# Patient Record
Sex: FEMALE | Race: OTHER | HISPANIC OR LATINO | Employment: UNEMPLOYED | URBAN - METROPOLITAN AREA
[De-identification: names, ages, dates, MRNs, and addresses within clinical notes are randomized per-mention and may not be internally consistent; named-entity substitution may affect disease eponyms.]

---

## 2020-04-02 ENCOUNTER — HOSPITAL ENCOUNTER (OUTPATIENT)
Facility: MEDICAL CENTER | Age: 20
End: 2020-04-02
Attending: OBSTETRICS & GYNECOLOGY | Admitting: OBSTETRICS & GYNECOLOGY
Payer: COMMERCIAL

## 2020-04-02 LAB
APPEARANCE UR: CLEAR
COLOR UR AUTO: YELLOW
GLUCOSE UR QL STRIP.AUTO: NEGATIVE MG/DL
KETONES UR QL STRIP.AUTO: 15 MG/DL
LEUKOCYTE ESTERASE UR QL STRIP.AUTO: ABNORMAL
NITRITE UR QL STRIP.AUTO: NEGATIVE
PH UR STRIP.AUTO: 7.5 [PH] (ref 5–8)
PROT UR QL STRIP: NEGATIVE MG/DL
RBC UR QL AUTO: NEGATIVE
SP GR UR: 1.01 (ref 1–1.03)

## 2020-04-02 PROCEDURE — 81002 URINALYSIS NONAUTO W/O SCOPE: CPT

## 2020-04-02 NOTE — DISCHARGE INSTRUCTIONS
General Instructions:  · If you think you are in labor, time contractions (lying on your left side) from the beginning of one contraction to the beginning of the next contraction for at least one hour.  · Increase fluid intake: you should consume 10-12 8 oz glasses of non-caffeinated fluid per day.  · Report any pressure or burning on urination to your physician.  · Monitor fetal movement: If you notice an absence or decrease in fetal movement, drink a large glass of water and rest on your side.  If there is no increase in movement, call your physician or go to the hospital for further evaluation.  · Report any sudden, sharp abdominal pain.  · Report any bleeding.  Spotting or pinkish discharge is normal after vaginal exam.  You may also spot after sexual intercourse.        Other Instructions:  Please carefully review your entire AFTER VISIT SUMMARY document for all discharge instructions.

## 2020-04-02 NOTE — PROGRESS NOTES
"20 y.o.  EDC 20 EG A24.2 here to LDA3 with FOB Ticen c/o increasing dizziness, n/v, and weakness for the last 2 1/2 months. PT states \"a few days ago she passed out and hit her head\". Pt states she has thrown up 2 or 3 times today. Last ate 2100. Pt states she called the office and they told her to come in and be evaluated. Clean catch urine specimen obtained and dipped. SG 1.015, small ketones, trace leukocytes. . No uc's. Report to Dr. Vela. PT has a f/u appointment on 20. Order of Zofran sent to [facility] [address] by MD. Discharge order received. Discharged to home. Pt educated to increase fluid, change positions slowly, and return to the hospital if symptoms worsen. Pt and fob verbalized understanding. Discharged to home, ambulatory.   "

## 2020-04-06 ENCOUNTER — HOSPITAL ENCOUNTER (OUTPATIENT)
Facility: MEDICAL CENTER | Age: 20
End: 2020-04-06
Attending: OBSTETRICS & GYNECOLOGY | Admitting: OBSTETRICS & GYNECOLOGY
Payer: COMMERCIAL

## 2020-04-06 VITALS
OXYGEN SATURATION: 98 % | WEIGHT: 138 LBS | HEART RATE: 77 BPM | SYSTOLIC BLOOD PRESSURE: 91 MMHG | DIASTOLIC BLOOD PRESSURE: 58 MMHG

## 2020-04-06 LAB
APPEARANCE UR: CLEAR
COLOR UR AUTO: YELLOW
GLUCOSE UR QL STRIP.AUTO: NEGATIVE MG/DL
KETONES UR QL STRIP.AUTO: NEGATIVE MG/DL
LEUKOCYTE ESTERASE UR QL STRIP.AUTO: NEGATIVE
NITRITE UR QL STRIP.AUTO: NEGATIVE
PH UR STRIP.AUTO: 7 [PH] (ref 5–8)
PROT UR QL STRIP: ABNORMAL MG/DL
RBC UR QL AUTO: NEGATIVE
SP GR UR: 1.01 (ref 1–1.03)

## 2020-04-06 PROCEDURE — 700111 HCHG RX REV CODE 636 W/ 250 OVERRIDE (IP): Performed by: SPECIALIST

## 2020-04-06 PROCEDURE — 81002 URINALYSIS NONAUTO W/O SCOPE: CPT

## 2020-04-06 RX ORDER — ONDANSETRON 4 MG/1
4 TABLET, ORALLY DISINTEGRATING ORAL EVERY 4 HOURS PRN
Status: DISCONTINUED | OUTPATIENT
Start: 2020-04-06 | End: 2020-04-06 | Stop reason: HOSPADM

## 2020-04-06 RX ADMIN — ONDANSETRON 4 MG: 4 TABLET, ORALLY DISINTEGRATING ORAL at 14:05

## 2020-04-06 NOTE — PROGRESS NOTES
20 y.o.  @ 24.6 weeks returns to L&D with LISANDRA Perla after passing out with vomiting at 10:00. VSS. /51 HR 66 SpO2 98%, repeat 91/58 Pulse 74 Report to Dr. Crook, Order for Zofran and reg diet. If pt able to tolerate meal may be discharged to f/u with Dr. Vela next week. Zofran given, Pt eating reg diet. Able to keep down food without vomiting. Discharge instructions given. Pt and FOB verbalized understanding. Discharged to home, ambulatory.

## 2020-04-27 ENCOUNTER — APPOINTMENT (OUTPATIENT)
Dept: RADIOLOGY | Facility: MEDICAL CENTER | Age: 20
End: 2020-04-27
Attending: EMERGENCY MEDICINE
Payer: COMMERCIAL

## 2020-04-27 ENCOUNTER — HOSPITAL ENCOUNTER (OUTPATIENT)
Facility: MEDICAL CENTER | Age: 20
End: 2020-04-27
Attending: OBSTETRICS & GYNECOLOGY | Admitting: OBSTETRICS & GYNECOLOGY
Payer: COMMERCIAL

## 2020-04-27 ENCOUNTER — HOSPITAL ENCOUNTER (EMERGENCY)
Facility: MEDICAL CENTER | Age: 20
End: 2020-04-27
Attending: EMERGENCY MEDICINE
Payer: COMMERCIAL

## 2020-04-27 VITALS
TEMPERATURE: 97.8 F | DIASTOLIC BLOOD PRESSURE: 63 MMHG | OXYGEN SATURATION: 98 % | HEART RATE: 89 BPM | SYSTOLIC BLOOD PRESSURE: 101 MMHG

## 2020-04-27 VITALS
SYSTOLIC BLOOD PRESSURE: 109 MMHG | HEART RATE: 84 BPM | DIASTOLIC BLOOD PRESSURE: 58 MMHG | TEMPERATURE: 99.7 F | HEIGHT: 64 IN | RESPIRATION RATE: 16 BRPM | OXYGEN SATURATION: 99 % | BODY MASS INDEX: 22.2 KG/M2 | WEIGHT: 130 LBS

## 2020-04-27 DIAGNOSIS — Z34.92 SECOND TRIMESTER PREGNANCY: ICD-10-CM

## 2020-04-27 DIAGNOSIS — R06.00 DYSPNEA, UNSPECIFIED TYPE: Primary | ICD-10-CM

## 2020-04-27 LAB
ALBUMIN SERPL BCP-MCNC: 3.7 G/DL (ref 3.2–4.9)
ALBUMIN/GLOB SERPL: 1.2 G/DL
ALP SERPL-CCNC: 80 U/L (ref 30–99)
ALT SERPL-CCNC: 9 U/L (ref 2–50)
ANION GAP SERPL CALC-SCNC: 12 MMOL/L (ref 7–16)
AST SERPL-CCNC: 18 U/L (ref 12–45)
BASOPHILS # BLD AUTO: 0.2 % (ref 0–1.8)
BASOPHILS # BLD: 0.02 K/UL (ref 0–0.12)
BILIRUB SERPL-MCNC: 0.3 MG/DL (ref 0.1–1.5)
BUN SERPL-MCNC: 7 MG/DL (ref 8–22)
CALCIUM SERPL-MCNC: 9 MG/DL (ref 8.5–10.5)
CHLORIDE SERPL-SCNC: 102 MMOL/L (ref 96–112)
CO2 SERPL-SCNC: 20 MMOL/L (ref 20–33)
COVID ORDER STATUS COVID19: YES
CREAT SERPL-MCNC: 0.47 MG/DL (ref 0.5–1.4)
EKG IMPRESSION: NORMAL
EOSINOPHIL # BLD AUTO: 0.01 K/UL (ref 0–0.51)
EOSINOPHIL NFR BLD: 0.1 % (ref 0–6.9)
ERYTHROCYTE [DISTWIDTH] IN BLOOD BY AUTOMATED COUNT: 40.1 FL (ref 35.9–50)
FIBRONECTIN FETAL SPEC QL: NEGATIVE
GLOBULIN SER CALC-MCNC: 3 G/DL (ref 1.9–3.5)
GLUCOSE SERPL-MCNC: 138 MG/DL (ref 65–99)
HCT VFR BLD AUTO: 31.5 % (ref 37–47)
HGB BLD-MCNC: 10.1 G/DL (ref 12–16)
IMM GRANULOCYTES # BLD AUTO: 0.04 K/UL (ref 0–0.11)
IMM GRANULOCYTES NFR BLD AUTO: 0.4 % (ref 0–0.9)
IRON SATN MFR SERPL: 11 % (ref 15–55)
IRON SERPL-MCNC: 64 UG/DL (ref 40–170)
LYMPHOCYTES # BLD AUTO: 2.89 K/UL (ref 1–4.8)
LYMPHOCYTES NFR BLD: 25.3 % (ref 22–41)
MCH RBC QN AUTO: 25.6 PG (ref 27–33)
MCHC RBC AUTO-ENTMCNC: 32.1 G/DL (ref 33.6–35)
MCV RBC AUTO: 79.7 FL (ref 81.4–97.8)
MONOCYTES # BLD AUTO: 0.51 K/UL (ref 0–0.85)
MONOCYTES NFR BLD AUTO: 4.5 % (ref 0–13.4)
NEUTROPHILS # BLD AUTO: 7.94 K/UL (ref 2–7.15)
NEUTROPHILS NFR BLD: 69.5 % (ref 44–72)
NRBC # BLD AUTO: 0 K/UL
NRBC BLD-RTO: 0 /100 WBC
PLATELET # BLD AUTO: 320 K/UL (ref 164–446)
PMV BLD AUTO: 11 FL (ref 9–12.9)
POTASSIUM SERPL-SCNC: 3.4 MMOL/L (ref 3.6–5.5)
PROT SERPL-MCNC: 6.7 G/DL (ref 6–8.2)
RBC # BLD AUTO: 3.95 M/UL (ref 4.2–5.4)
SARS-COV-2 RNA RESP QL NAA+PROBE: NEGATIVE
SODIUM SERPL-SCNC: 134 MMOL/L (ref 135–145)
SPECIMEN SOURCE: NORMAL
TIBC SERPL-MCNC: 597 UG/DL (ref 250–450)
TROPONIN T SERPL-MCNC: <6 NG/L (ref 6–19)
UIBC SERPL-MCNC: 533 UG/DL (ref 110–370)
WBC # BLD AUTO: 11.4 K/UL (ref 4.8–10.8)

## 2020-04-27 PROCEDURE — 80053 COMPREHEN METABOLIC PANEL: CPT

## 2020-04-27 PROCEDURE — 71045 X-RAY EXAM CHEST 1 VIEW: CPT

## 2020-04-27 PROCEDURE — 83540 ASSAY OF IRON: CPT

## 2020-04-27 PROCEDURE — 700117 HCHG RX CONTRAST REV CODE 255: Performed by: EMERGENCY MEDICINE

## 2020-04-27 PROCEDURE — 83550 IRON BINDING TEST: CPT

## 2020-04-27 PROCEDURE — 93005 ELECTROCARDIOGRAM TRACING: CPT | Performed by: EMERGENCY MEDICINE

## 2020-04-27 PROCEDURE — 99285 EMERGENCY DEPT VISIT HI MDM: CPT

## 2020-04-27 PROCEDURE — G2023 SPECIMEN COLLECT COVID-19: HCPCS | Performed by: OBSTETRICS & GYNECOLOGY

## 2020-04-27 PROCEDURE — 82731 ASSAY OF FETAL FIBRONECTIN: CPT

## 2020-04-27 PROCEDURE — U0004 COV-19 TEST NON-CDC HGH THRU: HCPCS

## 2020-04-27 PROCEDURE — 36415 COLL VENOUS BLD VENIPUNCTURE: CPT

## 2020-04-27 PROCEDURE — 71275 CT ANGIOGRAPHY CHEST: CPT

## 2020-04-27 PROCEDURE — 85025 COMPLETE CBC W/AUTO DIFF WBC: CPT

## 2020-04-27 PROCEDURE — 84484 ASSAY OF TROPONIN QUANT: CPT

## 2020-04-27 RX ADMIN — IOHEXOL 80 ML: 350 INJECTION, SOLUTION INTRAVENOUS at 19:48

## 2020-04-27 ASSESSMENT — ENCOUNTER SYMPTOMS
WEAKNESS: 0
ABDOMINAL PAIN: 0
FOCAL WEAKNESS: 0
WHEEZING: 0
FEVER: 0
COUGH: 0
SHORTNESS OF BREATH: 1
BACK PAIN: 0
NECK PAIN: 0
VOMITING: 0
DIZZINESS: 0
SORE THROAT: 0
HEADACHES: 0
NAUSEA: 0

## 2020-04-27 ASSESSMENT — LIFESTYLE VARIABLES: SUBSTANCE_ABUSE: 0

## 2020-04-27 NOTE — DISCHARGE INSTRUCTIONS
General Instructions:  · If you think you are in labor, time contractions (lying on your left side) from the beginning of one contraction to the beginning of the next contraction for at least one hour.  · Increase fluid intake: you should consume 10-12 8 oz glasses of non-caffeinated fluid per day.  · Report any pressure or burning on urination to your physician.  · Monitor fetal movement: If you notice an absence or decrease in fetal movement, drink a large glass of water and rest on your side.  If there is no increase in movement, call your physician or go to the hospital for further evaluation.  · Report any sudden, sharp abdominal pain.  · Report any bleeding.  Spotting or pinkish discharge is normal after vaginal exam.  You may also spot after sexual intercourse.    Pre-term Labor (<37 weeks):  Call your physician or return to the hospital if:  · You have painless regular contractions more than 4 in one hour.  · Your water breaks (remember time and color).  · You have menstrual-like cramps, a low dull backache or pressure in your pelvis or back.  · Your baby does not move enough to complete the daily kick count (10 movements in 2 hours).  · Your baby moves much less often than on the days before or you have not felt your baby move all day.  · Please review the MEDICATION LIST section of your AFTER VISIT SUMMARY document.  · Take your medication as prescribed      Other Instructions:  Please carefully review your entire AFTER VISIT SUMMARY document for all discharge instructions.    Self-Isolating at Home  If it is determined that you do not need to be hospitalized and can be isolated at home please follow the follow the prevention steps below as based on CDC guidelines.  Stay home except to get medical care  People who are mildly ill with unconfirmed COVID-19 or have any other infectious respiratory illness are able to isolate at home during their illness. You should restrict activities outside your home,  except for getting medical care. Do not go to work, school, or public areas. Avoid using public transportation, ride-sharing, or taxis.  Call ahead before visiting your doctor  If you have a medical appointment, call the healthcare provider and tell them that you have or may have unconfirmed COVID-19 or another possibly contagious respiratory illness. This will help the healthcare provider’s office take steps to keep other people from getting infected or exposed.  Separate yourself from other people and animals in your home  As much as possible, you should stay in a specific room and away from other people in your home. Also, you should use a separate bathroom, if available.  You should restrict contact with pets and other animals while you are sick, just like you would around other people. When possible, have another member of your household care for your animals while you are sick. If you must care for your pet or be around animals while you are sick, wash your hands before and after you interact with pets.  Wear a facemask  You should wear a facemask when you are around other people (e.g., sharing a room or vehicle) or pets and before you enter a healthcare provider’s office. If you are not able to wear a facemask (for example, because it causes trouble breathing), then people who live with you should not stay in the same room with you, or they should wear a facemask if they enter your room.  Cover your coughs and sneezes  Cover your mouth and nose with a tissue when you cough or sneeze. Throw used tissues in a lined trash can. Immediately wash your hands with soap and water for at least 20 seconds or, if soap and water are not available, clean your hands with an alcohol-based hand  that contains at least 60% alcohol.  Clean your hands often  Wash your hands often with soap and water for at least 20 seconds, especially after blowing your nose, coughing, or sneezing; going to the bathroom; and before  eating or preparing food. If soap and water are not readily available, use an alcohol-based hand  with at least 60% alcohol, covering all surfaces of your hands and rubbing them together until they feel dry.  Soap and water are the best option if hands are visibly dirty. Avoid touching your eyes, nose, and mouth with unwashed hands.  Avoid sharing personal household items  You should not share dishes, drinking glasses, cups, eating utensils, towels, or bedding with other people or pets in your home. After using these items, they should be washed thoroughly with soap and water.  Clean all “high-touch” surfaces everyday  High touch surfaces include counters, tabletops, doorknobs, bathroom fixtures, toilets, phones, keyboards, tablets, and bedside tables. Also, clean any surfaces that may have blood, stool, or body fluids on them. Use a household cleaning spray or wipe, according to the label instructions. Labels contain instructions for safe and effective use of the cleaning product including precautions you should take when applying the product, such as wearing gloves and making sure you have good ventilation during use of the product.  Monitor your symptoms  Seek prompt medical attention if your illness is worsening (e.g., difficulty breathing). Before seeking care, call your healthcare provider and tell them that you have, or are being evaluated for, unconfirmed COVID-19 or another infectious respiratory illness. Put on a facemask before you enter the facility. These steps will help the healthcare provider’s office to keep other people in the office or waiting room from getting infected or exposed. Ask your healthcare provider to call the local or state health department. Persons who are placed under active monitoring or facilitated self-monitoring should follow instructions provided by their local health department or occupational health professionals, as appropriate. When working with your local health  department check their available hours.  If you have a medical emergency and need to call 911, notify the dispatch personnel that you have, or are being evaluated for unconfirmed COVID-19 or another infectious respiratory illness. If possible, put on a facemask before emergency medical services arrive.  Discontinuing home isolation  Patients with unconfirmed COVID-19 or other infectious respiratory illnesses should remain under home isolation precautions until the risk of secondary transmission to others is thought to be low. In general that means 72 hours after fever resolves without the use of fever reducing medications, AND symptoms have improved AND at least 7 days since symptoms first appeared. If you have questions or concerns consult your healthcare providers or your local health department.  Per CDC guidelines, you are not required to provide a healthcare provider’s note to validate your illness or to return to work, as healthcare provider offices and medical facilities may be extremely busy and not able to provide such documentation in a timely way.

## 2020-04-28 NOTE — PROGRESS NOTES
PT arrived with CO intermittent ABD tightening that is accompanied by pain in groin area and back. When walking to triage RN noticed PT appears to be SOB.  PT placed on TOCO and EFM. PT appears to have labored breathing.  SOB discussed. She stated she sometimes has had it with activity in the past but now is also having it at rest.  This started a few days ago and continues to worsen.  She also CO HA that started this AM.  She denies sore throat, cough or fever.  She is feeling over all weak and not well.  She stated she possibly could have Covid 19 exposure from her spouse that works at [employer] and had been exposed to people with covid like symptoms.    1300 Report to [facility], per MD order R/O covid and FFN    PT has been up to use bathroom twice and after using bathroom, she is very SOB and SPO2 was in low 80's for 2 minutes. RN at  when monitor for SP02 alarming on multiple occastions. PT would appear SOB and saturations between 74% and 82% at that time     1630 Call to [facility] with report of Covid test and FFN. MD aware PT is SOB and having episodes of desaturation in oxygen levels. Orders to send to ER .    1700 PT in WC to ER RED #6

## 2020-04-28 NOTE — ED PROVIDER NOTES
ED Provider Note     2020  6:03 PM    Means of Arrival: Walk In  History obtained by: patient  Limitations:none    CHIEF COMPLAINT  Chief Complaint   Patient presents with   • Shortness of Breath       HPI  Joseline Henderson is a 20 y.o. female currently 28 weeks pregnant who presents with concerns of shortness of breath.  She was in labor and delivery prior to coming to the emergency department.  She was found to have multiple episodes of desaturations into the low 80s while at the labor and delivery unit.  COVID-19 testing was done and is negative.  She says that with simple activities such as walking to and on the room in a house she will become extremely short of breath, having to stop.  Not having any chest pain.  No fevers.  She leaves she may have had contact with people with respiratory symptoms, coughs.  Her  works for delivery service.  She has had 1 previous pregnancy that resulted in a spontaneous .    REVIEW OF SYSTEMS  Review of Systems   Constitutional: Negative for fever.   HENT: Negative for sore throat.    Respiratory: Positive for shortness of breath. Negative for cough and wheezing.    Cardiovascular: Negative for chest pain and leg swelling.   Gastrointestinal: Negative for abdominal pain, nausea and vomiting.   Genitourinary: Negative for dysuria.   Musculoskeletal: Negative for back pain and neck pain.   Neurological: Negative for dizziness, focal weakness, weakness and headaches.   Psychiatric/Behavioral: Negative for substance abuse.   All other systems reviewed and are negative.    See HPI for further details.    PAST MEDICAL HISTORY   Otherwise healthy    SOCIAL HISTORY  Social History     Tobacco Use   • Smoking status: Not on file   Substance and Sexual Activity   • Alcohol use: Not on file   • Drug use: Not on file   • Sexual activity: Not on file       SURGICAL HISTORY  patient denies any surgical history    CURRENT MEDICATIONS  Home Medications    **Home  "medications have not yet been reviewed for this encounter**         ALLERGIES  No Known Allergies    PHYSICAL EXAM  VITAL SIGNS: /58   Pulse 84   Temp 37.6 °C (99.7 °F) (Temporal)   Resp 16   Ht 1.626 m (5' 4\")   Wt 59 kg (130 lb)   SpO2 99%   BMI 22.31 kg/m²     Pulse ox interpretation: I interpret this pulse ox as normal at 96% on room air at rest.  Constitutional: Alert in no apparent distress.  HENT: No signs of trauma, Bilateral external ears normal, Nose normal.   Eyes: Pupils are equal, Conjunctiva normal, Non-icteric.   Neck: Normal range of motion, No tenderness, Supple, No stridor.   Lymphatic: No lymphadenopathy noted.   Cardiovascular: Regular rate and rhythm, no murmurs. Symmetric distal pulses. No cyanosis of extremities. No peripheral edema of extremities.  Thorax & Lungs: Normal lung sounds, nondistressed but she does appear to have increased respiratory rate.  Abdomen: Palpable uterus above the level of umbilicus.  No tenderness or guarding.  Skin: Warm, Dry, No erythema, No rash.   Back: No midline bony tenderness, No CVA tenderness.   Musculoskeletal: Good range of motion in all major joints. No tenderness to palpation or major deformities noted.  No calf edema or tenderness.  Neurologic: Alert , Normal motor function, Normal sensory function, No focal deficits noted.   Psychiatric: Affect normal, Judgment normal, Mood normal.   Physical Exam      DIAGNOSTIC STUDIES / PROCEDURES    EKG  Results for orders placed or performed during the hospital encounter of 20   EKG   Result Value Ref Range    Report       [facility]    Test Date:  2020  Pt Name:    CAILIN REMY               Department: ER  MRN:        [id number]                      Room:        06  Gender:     Female                       Technician: [id number]  :        [id number]                   Requested By:JAGDISH ESTEBAN II  Order #:    [id number]                    Reading " MD: Navin Cano II, MD    Measurements  Intervals                                Axis  Rate:       68                           P:          78  AK:         152                          QRS:        55  QRSD:       76                           T:          34  QT:         384  QTc:        409    Interpretive Statements  SINUS RHYTHM  Rate 68  Normal intervals  No ST elevation or depression. Normal twaves  Normal sinus rhythm EKG  No previous ECG available for comparison  Electronically Signed On 4- 20:38:45 PDT by Navin Cano II, MD           LABS  Pertinent Labs & Imaging studies reviewed. (See chart for details)    RADIOLOGY  Pertinent Labs & Imaging studies reviewed. (See chart for details)    COURSE & MEDICAL DECISION MAKING  Pertinent Labs & Imaging studies reviewed. (See chart for details)    6:03 PM This is an emergent evaluation of a  20 y.o. female 28 weeks pregnant presents with exertional dyspnea, pulse ox readings with hypoxia in labor and delivery.  COVID-19 nasal swab testing was negative.  On exam she has a normal pulse ox level here on room air.  She does appear mildly short of breath.  Exam otherwise unremarkable.  Plan to evaluate for possible pneumonia, viral infection, PE, myocarditis/cardiomyopathy related to pregnancy, or dyspnea associated with a normal pregnancy Will start with serum studies and an x-ray.  We have also talked about the risk and benefits of a PE study which include radiation to the fetus.  She was agreeable to pursue PE study if we do not have another explanation of her shortness of breath.    7:00 PM  Labs with minor abnormalities, but they are within acceptable range.  Troponin negative.  Hemoglobin normal.  I have ordered a CT PE study.  There is no utility of d-dimer testing given she is pregnant.  She still agrees with pursuing CT PE study.    8:00 PM  CT shows no signs of pulmonary embolism, no infiltrates, no edema.  There is no explanation for dyspnea  or hypoxia.  At this point I am starting to question if saturations actually were dropping with exertion or if this was a bad reading from pulse oximeter.  Will will have her ambulate to see if there is any changes in saturations.    8:38 PM  She was able to ambulate with normal effort, showed no signs of respiratory distress or hypoxia.  I did send iron studies because she had a low MCV and this can be followed up with her primary provider.  I did tell her about this and she plans to call her OB/GYN doctor tomorrow to arrange for follow-up.  Unclear exact cause of dyspnea but emergent etiologies likely ruled out especially PE, no signs of congestive heart failure clinically or on CT, normal troponin thus not myocarditis, no signs of pneumonia or viral illness on CT (specifically COVID-19).      The patient will return for worsening symptoms and is stable at the time of discharge. The patient verbalizes understanding. Guidance was provided on appropriate use of medications including driving under the influence, overdose, and side effects.         FINAL IMPRESSION    ICD-10-CM   1. Dyspnea, unspecified type R06.00   2. Second trimester pregnancy Z34.92            This dictation was created using voice recognition software. The accuracy of the dictation is limited to the abilities of the software. I expect there may be some errors of grammar and possibly content. The nursing notes were reviewed and certain aspects of this information were incorporated into this note.    Electronically signed by: Navin Cano II, M.D., 4/27/2020 6:03 PM

## 2020-04-28 NOTE — ED NOTES
Discharge paperwork instruction provided. Pt verbalized understanding, dressed self, then ambulated out of ER with steady gait, alert and oriented.

## 2020-04-28 NOTE — ED NOTES
"During \"road test\" pt was able to walk around room for approx. 10min with sats at 95% on RA. Pt denies dizziness and sob during process.  "

## 2020-04-28 NOTE — ED TRIAGE NOTES
Name and  Verified for triage    Pt here from L&D with c/o SOB. Pt is 28wks pregnant. Tested for covid- negative.     C/o SOB x 2 months which has worsened over past week. States is SOB at rest and with simple tasks. Denies recent cough or exposure to covid. Denies recent travel. VSS.

## 2020-06-17 ENCOUNTER — HOSPITAL ENCOUNTER (OUTPATIENT)
Facility: MEDICAL CENTER | Age: 20
End: 2020-06-17
Attending: OBSTETRICS & GYNECOLOGY | Admitting: OBSTETRICS & GYNECOLOGY
Payer: COMMERCIAL

## 2020-06-17 VITALS
HEART RATE: 65 BPM | OXYGEN SATURATION: 97 % | SYSTOLIC BLOOD PRESSURE: 109 MMHG | DIASTOLIC BLOOD PRESSURE: 61 MMHG | RESPIRATION RATE: 16 BRPM | TEMPERATURE: 97.4 F | BODY MASS INDEX: 24.8 KG/M2 | HEIGHT: 63 IN | WEIGHT: 140 LBS

## 2020-06-17 LAB
APPEARANCE UR: CLEAR
COLOR UR AUTO: YELLOW
GLUCOSE UR QL STRIP.AUTO: NEGATIVE MG/DL
KETONES UR QL STRIP.AUTO: ABNORMAL MG/DL
LEUKOCYTE ESTERASE UR QL STRIP.AUTO: NEGATIVE
NITRITE UR QL STRIP.AUTO: NEGATIVE
PH UR STRIP.AUTO: 8.5 [PH] (ref 5–8)
PROT UR QL STRIP: 30 MG/DL
RBC UR QL AUTO: NEGATIVE
SP GR UR STRIP.AUTO: 1.01 (ref 1–1.03)

## 2020-06-17 PROCEDURE — 81002 URINALYSIS NONAUTO W/O SCOPE: CPT

## 2020-06-17 PROCEDURE — 59025 FETAL NON-STRESS TEST: CPT

## 2020-06-17 PROCEDURE — 700105 HCHG RX REV CODE 258

## 2020-06-17 RX ORDER — SODIUM CHLORIDE, SODIUM LACTATE, POTASSIUM CHLORIDE, CALCIUM CHLORIDE 600; 310; 30; 20 MG/100ML; MG/100ML; MG/100ML; MG/100ML
INJECTION, SOLUTION INTRAVENOUS
Status: COMPLETED
Start: 2020-06-17 | End: 2020-06-17

## 2020-06-17 RX ORDER — SODIUM CHLORIDE, SODIUM LACTATE, POTASSIUM CHLORIDE, CALCIUM CHLORIDE 600; 310; 30; 20 MG/100ML; MG/100ML; MG/100ML; MG/100ML
1000 INJECTION, SOLUTION INTRAVENOUS CONTINUOUS
Status: DISCONTINUED | OUTPATIENT
Start: 2020-06-17 | End: 2020-06-17 | Stop reason: HOSPADM

## 2020-06-17 RX ADMIN — SODIUM CHLORIDE, POTASSIUM CHLORIDE, SODIUM LACTATE AND CALCIUM CHLORIDE 1000 ML: 600; 310; 30; 20 INJECTION, SOLUTION INTRAVENOUS at 18:01

## 2020-06-17 RX ADMIN — SODIUM CHLORIDE, SODIUM LACTATE, POTASSIUM CHLORIDE, CALCIUM CHLORIDE 1000 ML: 600; 310; 30; 20 INJECTION, SOLUTION INTRAVENOUS at 18:01

## 2020-06-17 ASSESSMENT — FIBROSIS 4 INDEX: FIB4 SCORE: .375

## 2020-06-17 ASSESSMENT — PAIN SCALES - GENERAL: PAINLEVEL: 3

## 2020-06-18 ENCOUNTER — HOSPITAL ENCOUNTER (OUTPATIENT)
Facility: MEDICAL CENTER | Age: 20
End: 2020-06-18
Attending: OBSTETRICS & GYNECOLOGY | Admitting: SPECIALIST
Payer: COMMERCIAL

## 2020-06-18 ENCOUNTER — HOSPITAL ENCOUNTER (OUTPATIENT)
Facility: MEDICAL CENTER | Age: 20
End: 2020-06-19
Attending: OBSTETRICS & GYNECOLOGY | Admitting: OBSTETRICS & GYNECOLOGY
Payer: COMMERCIAL

## 2020-06-18 VITALS
BODY MASS INDEX: 24.8 KG/M2 | RESPIRATION RATE: 18 BRPM | OXYGEN SATURATION: 99 % | WEIGHT: 140 LBS | HEART RATE: 64 BPM | DIASTOLIC BLOOD PRESSURE: 79 MMHG | SYSTOLIC BLOOD PRESSURE: 125 MMHG | TEMPERATURE: 98.4 F | HEIGHT: 63 IN

## 2020-06-18 VITALS
HEIGHT: 63 IN | WEIGHT: 145 LBS | DIASTOLIC BLOOD PRESSURE: 71 MMHG | TEMPERATURE: 96.5 F | RESPIRATION RATE: 18 BRPM | HEART RATE: 76 BPM | OXYGEN SATURATION: 99 % | SYSTOLIC BLOOD PRESSURE: 116 MMHG | BODY MASS INDEX: 25.69 KG/M2

## 2020-06-18 PROCEDURE — 59025 FETAL NON-STRESS TEST: CPT

## 2020-06-18 ASSESSMENT — FIBROSIS 4 INDEX
FIB4 SCORE: .375
FIB4 SCORE: .375

## 2020-06-18 ASSESSMENT — PAIN SCALES - GENERAL: PAINLEVEL: 5 - MODERATE PAIN

## 2020-06-18 NOTE — DISCHARGE INSTRUCTIONS
General Instructions:  · If you think you are in labor, time contractions (lying on your left side) from the beginning of one contraction to the beginning of the next contraction for at least one hour.  · Increase fluid intake: you should consume 10-12 8 oz glasses of non-caffeinated fluid per day.  · Report any pressure or burning on urination to your physician.  · Monitor fetal movement: If you notice an absence or decrease in fetal movement, drink a large glass of water and rest on your side.  If there is no increase in movement, call your physician or go to the hospital for further evaluation.  · Report any sudden, sharp abdominal pain.  · Report any bleeding.  Spotting or pinkish discharge is normal after vaginal exam.  You may also spot after sexual intercourse.  General Instructions:  · If you think you are in labor, time contractions (lying on your left side) from the beginning of one contraction to the beginning of the next contraction for at least one hour.  · Increase fluid intake: you should consume 10-12 8 oz glasses of non-caffeinated fluid per day.  · Report any pressure or burning on urination to your physician.  · Monitor fetal movement: If you notice an absence or decrease in fetal movement, drink a large glass of water and rest on your side.  If there is no increase in movement, call your physician or go to the hospital for further evaluation.  · Report any sudden, sharp abdominal pain.  · Report any bleeding.  Spotting or pinkish discharge is normal after vaginal exam.  You may also spot after sexual intercourse.  General Instructions:  · If you think you are in labor, time contractions (lying on your left side) from the beginning of one contraction to the beginning of the next contraction for at least one hour.  · Increase fluid intake: you should consume 10-12 8 oz glasses of non-caffeinated fluid per day.  · Report any pressure or burning on urination to your physician.  · Monitor fetal  movement: If you notice an absence or decrease in fetal movement, drink a large glass of water and rest on your side.  If there is no increase in movement, call your physician or go to the hospital for further evaluation.  · Report any sudden, sharp abdominal pain.  · Report any bleeding.  Spotting or pinkish discharge is normal after vaginal exam.  You may also spot after sexual intercourse.    Pre-term Labor (<37 weeks):  Call your physician or return to the hospital if:  · You have painless regular contractions more than 4 in one hour.  · Your water breaks (remember time and color).  · You have menstrual-like cramps, a low dull backache or pressure in your pelvis or back.  · Your baby does not move enough to complete the daily kick count (10 movements in 2 hours).  · Your baby moves much less often than on the days before or you have not felt your baby move all day.  · Please review the MEDICATION LIST section of your AFTER VISIT SUMMARY document.  · Take your medication as prescribed      Other Instructions:  Please carefully review your entire AFTER VISIT SUMMARY document for all discharge instructions.  Other Instructions:  Please carefully review your entire AFTER VISIT SUMMARY document for all discharge instructions.  Labor Instructions (37 - 39 weeks):  Call your physician or return to hospital if:  · You have regular contractions that get progressively closer, longer and stronger.  · Your water breaks (remember time and color).  · You have bleeding like a period.  · Your baby does not move enough to complete the daily kick counts (10 movements in 2 hours)  · Your baby moves much less often than on the days before or you have not felt your baby move all day.      Other Instructions:  Please carefully review your entire AFTER VISIT SUMMARY document for all discharge instructions.

## 2020-06-18 NOTE — PROGRESS NOTES
Pt presents to labor and delivery reporting contractions and low back pain worsening throughout the day. Pt oriented to LDA 1, EFM applied, VS, S. SVE no change from yesterday. [id number] Dr. Crook at bedside, report given, order received to discharge pt to home since no cervical change from yesterday. Will obtain NST and discharge pt.

## 2020-06-18 NOTE — PROGRESS NOTES
1630 Pt presents to labor and delivery reporting abdominal cramping since this am. Pt is a  with an EDC of 20 making her 35.1 weeks pregnant. Oriented to S219,EFM applied, VS, S.  1730 Dr. Ziegler notified of patient's arrival/status. Order received to start IV.  174 UA obtained  1800 IV started  185 SVE - No change. Order received to discharge pt to home; labor precautions given, verbalizes understanding.   Discharged to home.

## 2020-06-19 ENCOUNTER — ANESTHESIA (OUTPATIENT)
Dept: ANESTHESIOLOGY | Facility: MEDICAL CENTER | Age: 20
End: 2020-06-19
Payer: COMMERCIAL

## 2020-06-19 ENCOUNTER — ANESTHESIA EVENT (OUTPATIENT)
Dept: ANESTHESIOLOGY | Facility: MEDICAL CENTER | Age: 20
End: 2020-06-19
Payer: COMMERCIAL

## 2020-06-19 PROCEDURE — 96376 TX/PRO/DX INJ SAME DRUG ADON: CPT

## 2020-06-19 PROCEDURE — 87081 CULTURE SCREEN ONLY: CPT

## 2020-06-19 PROCEDURE — 87150 DNA/RNA AMPLIFIED PROBE: CPT

## 2020-06-19 PROCEDURE — 96374 THER/PROPH/DIAG INJ IV PUSH: CPT

## 2020-06-19 PROCEDURE — 700105 HCHG RX REV CODE 258: Performed by: SPECIALIST

## 2020-06-19 PROCEDURE — 700111 HCHG RX REV CODE 636 W/ 250 OVERRIDE (IP): Performed by: SPECIALIST

## 2020-06-19 PROCEDURE — 700105 HCHG RX REV CODE 258

## 2020-06-19 RX ORDER — SODIUM CHLORIDE, SODIUM LACTATE, POTASSIUM CHLORIDE, CALCIUM CHLORIDE 600; 310; 30; 20 MG/100ML; MG/100ML; MG/100ML; MG/100ML
INJECTION, SOLUTION INTRAVENOUS
Status: COMPLETED
Start: 2020-06-19 | End: 2020-06-19

## 2020-06-19 RX ORDER — ONDANSETRON 4 MG/1
4 TABLET, ORALLY DISINTEGRATING ORAL ONCE
Status: COMPLETED | OUTPATIENT
Start: 2020-06-19 | End: 2020-06-19

## 2020-06-19 RX ORDER — TERBUTALINE SULFATE 1 MG/ML
0.25 INJECTION, SOLUTION SUBCUTANEOUS ONCE
Status: DISCONTINUED | OUTPATIENT
Start: 2020-06-19 | End: 2020-06-19 | Stop reason: HOSPADM

## 2020-06-19 RX ORDER — SODIUM CHLORIDE, SODIUM LACTATE, POTASSIUM CHLORIDE, CALCIUM CHLORIDE 600; 310; 30; 20 MG/100ML; MG/100ML; MG/100ML; MG/100ML
INJECTION, SOLUTION INTRAVENOUS CONTINUOUS
Status: DISCONTINUED | OUTPATIENT
Start: 2020-06-19 | End: 2020-06-19 | Stop reason: HOSPADM

## 2020-06-19 RX ORDER — TERBUTALINE SULFATE 1 MG/ML
INJECTION, SOLUTION SUBCUTANEOUS
Status: DISCONTINUED
Start: 2020-06-19 | End: 2020-06-19 | Stop reason: HOSPADM

## 2020-06-19 RX ADMIN — SODIUM CHLORIDE, POTASSIUM CHLORIDE, SODIUM LACTATE AND CALCIUM CHLORIDE 1000 ML: 600; 310; 30; 20 INJECTION, SOLUTION INTRAVENOUS at 01:23

## 2020-06-19 RX ADMIN — ONDANSETRON 4 MG: 4 TABLET, ORALLY DISINTEGRATING ORAL at 03:50

## 2020-06-19 RX ADMIN — FENTANYL CITRATE 50 MCG: 50 INJECTION INTRAMUSCULAR; INTRAVENOUS at 02:43

## 2020-06-19 RX ADMIN — FENTANYL CITRATE 50 MCG: 50 INJECTION INTRAMUSCULAR; INTRAVENOUS at 03:51

## 2020-06-19 RX ADMIN — SODIUM CHLORIDE, POTASSIUM CHLORIDE, SODIUM LACTATE AND CALCIUM CHLORIDE: 600; 310; 30; 20 INJECTION, SOLUTION INTRAVENOUS at 02:42

## 2020-06-19 RX ADMIN — FENTANYL CITRATE 50 MCG: 50 INJECTION INTRAMUSCULAR; INTRAVENOUS at 01:23

## 2020-06-19 NOTE — PROGRESS NOTES
DATE OF SERVICE:    The patient is a very pleasant 20-year-old nullipara who is today about 35   weeks' and 2 days' gestation and she presented to labor with complaints of   abdominal discomfort and thought that she might be having contractions.  She   has had her prenatal care with Dr. Vela.  She was here in the triage area   of labor and delivery yesterday and apparently, her cervix was closed.  Today   by the nurse's exam, her cervix is closed and 20% effaced.  The fetal heart   tracing appears category 1.  The patient appears well-developed and   well-nourished and relaxed and alert and comfortable and in no apparent   distress.  We will continue to monitor and anticipate discharging the patient   home later after we have a reactive nonstress test.       ____________________________________     Andrae Crook MD    MED / NTS    DD:  06/18/2020 16:51:05  DT:  06/18/2020 23:21:24    D#:  [id number]  Job#:  [id number]

## 2020-06-19 NOTE — PROGRESS NOTES
edc   35.3 weeks    Complaints: uc's    2340: pt to labor and delivery c/o uc's that are strong and frequent.  efm and toco applied. Vss.  Pt denies vaginal bleeding or leaking.  Pt reports fetal movement.  sve /-2.  Pt in tears.  She is not coping well with uc's.    0040: rn at , gbs collected.  sve 2-3/80/-2.  Pt c/o severe pain.  Call to dr. Crook.  Orders received.    0120: rn at , iv started.  Pt very anxious.  Pt refuse the terbutaline due to anxiety.  Iv fentanyl given for pain.    0145: rn at , pt reports decrease in pain and anxiety.  Pt coping well at this time.      0215: rn at , sve 3/70/-2, report to dr. Crook. Orders to observe patient until the am.  Orders for pain medication received.      0630: dr. Crook at , sve 2-3/60/-2.  Pt denies needs at this time and reports uc's are slowing down.  Dr. Crook to give report to dr. oquendo to make a plan of care.      0700: report to billie sevilla

## 2020-06-19 NOTE — PROGRESS NOTES
The patient is a very pleasant 20-year-old nullipara who is today 35 weeks and 3 days gestation.  She has had her prenatal care with Dr. Vela.  She presented to labor and delivery yesterday afternoon in the late afternoon with complaints of contractions and her cervix was found to be essentially long and closed and high and so she was sent home.  She returned around midnight saying that her contractions were more frequent and more painful.  Around midnight her cervix was found to be about 1 cm dilated and 70% effaced and the station was -22 station.  Then about an hour later her cervix was found to be according to the nurses exam about 2 to 3 cm dilated and 80% effaced and -2 station and she was experiencing significant pain with her contractions.  She was given fentanyl intravenously.  According to the nurses her nurse the cervix was about 3 cm dilated and 70% effaced and -2 station at about 2:15 this morning.  I just checked the patient's cervix and by my exam I feel that the cervix is 2 cm dilated and 60% effaced and -2 to -3 station.  The fetal heart tracing is reactive.  She does appear to be having some contractions.  We will continue to monitor.  Andrae Crook MD

## 2020-06-20 LAB — GP B STREP DNA SPEC QL NAA+PROBE: NEGATIVE

## 2020-07-12 ENCOUNTER — HOSPITAL ENCOUNTER (OUTPATIENT)
Facility: MEDICAL CENTER | Age: 20
End: 2020-07-12
Attending: OBSTETRICS & GYNECOLOGY | Admitting: OBSTETRICS & GYNECOLOGY
Payer: COMMERCIAL

## 2020-07-12 ENCOUNTER — ANESTHESIA (OUTPATIENT)
Dept: ANESTHESIOLOGY | Facility: MEDICAL CENTER | Age: 20
End: 2020-07-12
Payer: COMMERCIAL

## 2020-07-12 ENCOUNTER — HOSPITAL ENCOUNTER (INPATIENT)
Facility: MEDICAL CENTER | Age: 20
LOS: 2 days | End: 2020-07-14
Attending: OBSTETRICS & GYNECOLOGY | Admitting: OBSTETRICS & GYNECOLOGY
Payer: COMMERCIAL

## 2020-07-12 ENCOUNTER — ANESTHESIA EVENT (OUTPATIENT)
Dept: ANESTHESIOLOGY | Facility: MEDICAL CENTER | Age: 20
End: 2020-07-12
Payer: COMMERCIAL

## 2020-07-12 VITALS
TEMPERATURE: 96.9 F | OXYGEN SATURATION: 98 % | HEART RATE: 88 BPM | HEIGHT: 63 IN | RESPIRATION RATE: 18 BRPM | SYSTOLIC BLOOD PRESSURE: 116 MMHG | DIASTOLIC BLOOD PRESSURE: 76 MMHG | WEIGHT: 143 LBS | BODY MASS INDEX: 25.34 KG/M2

## 2020-07-12 LAB
BASOPHILS # BLD AUTO: 0.3 % (ref 0–1.8)
BASOPHILS # BLD: 0.05 K/UL (ref 0–0.12)
COVID ORDER STATUS COVID19: NORMAL
EOSINOPHIL # BLD AUTO: 0.04 K/UL (ref 0–0.51)
EOSINOPHIL NFR BLD: 0.2 % (ref 0–6.9)
ERYTHROCYTE [DISTWIDTH] IN BLOOD BY AUTOMATED COUNT: 50.9 FL (ref 35.9–50)
HCT VFR BLD AUTO: 35.9 % (ref 37–47)
HGB BLD-MCNC: 11.3 G/DL (ref 12–16)
HOLDING TUBE BB 8507: NORMAL
IMM GRANULOCYTES # BLD AUTO: 0.09 K/UL (ref 0–0.11)
IMM GRANULOCYTES NFR BLD AUTO: 0.6 % (ref 0–0.9)
LYMPHOCYTES # BLD AUTO: 3.05 K/UL (ref 1–4.8)
LYMPHOCYTES NFR BLD: 18.7 % (ref 22–41)
MCH RBC QN AUTO: 25.4 PG (ref 27–33)
MCHC RBC AUTO-ENTMCNC: 31.5 G/DL (ref 33.6–35)
MCV RBC AUTO: 80.7 FL (ref 81.4–97.8)
MONOCYTES # BLD AUTO: 0.83 K/UL (ref 0–0.85)
MONOCYTES NFR BLD AUTO: 5.1 % (ref 0–13.4)
NEUTROPHILS # BLD AUTO: 12.25 K/UL (ref 2–7.15)
NEUTROPHILS NFR BLD: 75.1 % (ref 44–72)
NRBC # BLD AUTO: 0 K/UL
NRBC BLD-RTO: 0 /100 WBC
PLATELET # BLD AUTO: 290 K/UL (ref 164–446)
PMV BLD AUTO: 10.5 FL (ref 9–12.9)
RBC # BLD AUTO: 4.45 M/UL (ref 4.2–5.4)
SARS-COV-2 RDRP RESP QL NAA+PROBE: NOTDETECTED
SPECIMEN SOURCE: NORMAL
WBC # BLD AUTO: 16.3 K/UL (ref 4.8–10.8)

## 2020-07-12 PROCEDURE — U0004 COV-19 TEST NON-CDC HGH THRU: HCPCS

## 2020-07-12 PROCEDURE — 700111 HCHG RX REV CODE 636 W/ 250 OVERRIDE (IP): Performed by: ANESTHESIOLOGY

## 2020-07-12 PROCEDURE — 700105 HCHG RX REV CODE 258: Performed by: ANESTHESIOLOGY

## 2020-07-12 PROCEDURE — 700111 HCHG RX REV CODE 636 W/ 250 OVERRIDE (IP): Performed by: OBSTETRICS & GYNECOLOGY

## 2020-07-12 PROCEDURE — 59025 FETAL NON-STRESS TEST: CPT

## 2020-07-12 PROCEDURE — 700111 HCHG RX REV CODE 636 W/ 250 OVERRIDE (IP)

## 2020-07-12 PROCEDURE — 36415 COLL VENOUS BLD VENIPUNCTURE: CPT

## 2020-07-12 PROCEDURE — 700102 HCHG RX REV CODE 250 W/ 637 OVERRIDE(OP): Performed by: OBSTETRICS & GYNECOLOGY

## 2020-07-12 PROCEDURE — C9803 HOPD COVID-19 SPEC COLLECT: HCPCS | Performed by: OBSTETRICS & GYNECOLOGY

## 2020-07-12 PROCEDURE — 85025 COMPLETE CBC W/AUTO DIFF WBC: CPT

## 2020-07-12 PROCEDURE — 700101 HCHG RX REV CODE 250: Performed by: ANESTHESIOLOGY

## 2020-07-12 PROCEDURE — 770002 HCHG ROOM/CARE - OB PRIVATE (112)

## 2020-07-12 PROCEDURE — A9270 NON-COVERED ITEM OR SERVICE: HCPCS | Performed by: OBSTETRICS & GYNECOLOGY

## 2020-07-12 PROCEDURE — 700105 HCHG RX REV CODE 258: Performed by: OBSTETRICS & GYNECOLOGY

## 2020-07-12 RX ORDER — FERROUS SULFATE 325(65) MG
325 TABLET ORAL DAILY
Status: ON HOLD | COMMUNITY
End: 2020-07-14

## 2020-07-12 RX ORDER — ROPIVACAINE HYDROCHLORIDE 2 MG/ML
INJECTION, SOLUTION EPIDURAL; INFILTRATION; PERINEURAL
Status: COMPLETED
Start: 2020-07-12 | End: 2020-07-12

## 2020-07-12 RX ORDER — MISOPROSTOL 200 UG/1
800 TABLET ORAL
Status: DISCONTINUED | OUTPATIENT
Start: 2020-07-12 | End: 2020-07-13 | Stop reason: HOSPADM

## 2020-07-12 RX ORDER — ONDANSETRON 2 MG/ML
4 INJECTION INTRAMUSCULAR; INTRAVENOUS EVERY 6 HOURS PRN
Status: DISCONTINUED | OUTPATIENT
Start: 2020-07-12 | End: 2020-07-14 | Stop reason: HOSPADM

## 2020-07-12 RX ORDER — SODIUM CHLORIDE, SODIUM LACTATE, POTASSIUM CHLORIDE, CALCIUM CHLORIDE 600; 310; 30; 20 MG/100ML; MG/100ML; MG/100ML; MG/100ML
INJECTION, SOLUTION INTRAVENOUS CONTINUOUS
Status: DISPENSED | OUTPATIENT
Start: 2020-07-12 | End: 2020-07-12

## 2020-07-12 RX ORDER — SODIUM CHLORIDE, SODIUM LACTATE, POTASSIUM CHLORIDE, AND CALCIUM CHLORIDE .6; .31; .03; .02 G/100ML; G/100ML; G/100ML; G/100ML
250 INJECTION, SOLUTION INTRAVENOUS PRN
Status: DISCONTINUED | OUTPATIENT
Start: 2020-07-12 | End: 2020-07-13 | Stop reason: HOSPADM

## 2020-07-12 RX ORDER — OXYCODONE HYDROCHLORIDE AND ACETAMINOPHEN 5; 325 MG/1; MG/1
2 TABLET ORAL EVERY 4 HOURS PRN
Status: DISCONTINUED | OUTPATIENT
Start: 2020-07-12 | End: 2020-07-14 | Stop reason: HOSPADM

## 2020-07-12 RX ORDER — ACETAMINOPHEN 325 MG/1
650 TABLET ORAL EVERY 4 HOURS PRN
Status: DISCONTINUED | OUTPATIENT
Start: 2020-07-12 | End: 2020-07-14 | Stop reason: HOSPADM

## 2020-07-12 RX ORDER — ROPIVACAINE HYDROCHLORIDE 2 MG/ML
INJECTION, SOLUTION EPIDURAL; INFILTRATION; PERINEURAL CONTINUOUS
Status: DISCONTINUED | OUTPATIENT
Start: 2020-07-12 | End: 2020-07-14 | Stop reason: HOSPADM

## 2020-07-12 RX ORDER — SODIUM CHLORIDE, SODIUM LACTATE, POTASSIUM CHLORIDE, AND CALCIUM CHLORIDE .6; .31; .03; .02 G/100ML; G/100ML; G/100ML; G/100ML
1000 INJECTION, SOLUTION INTRAVENOUS
Status: COMPLETED | OUTPATIENT
Start: 2020-07-12 | End: 2020-07-12

## 2020-07-12 RX ORDER — ONDANSETRON 4 MG/1
4 TABLET, ORALLY DISINTEGRATING ORAL EVERY 6 HOURS PRN
Status: DISCONTINUED | OUTPATIENT
Start: 2020-07-12 | End: 2020-07-14 | Stop reason: HOSPADM

## 2020-07-12 RX ORDER — OXYCODONE HYDROCHLORIDE AND ACETAMINOPHEN 5; 325 MG/1; MG/1
1 TABLET ORAL EVERY 4 HOURS PRN
Status: DISCONTINUED | OUTPATIENT
Start: 2020-07-12 | End: 2020-07-14 | Stop reason: HOSPADM

## 2020-07-12 RX ORDER — ROPIVACAINE HYDROCHLORIDE 2 MG/ML
INJECTION, SOLUTION EPIDURAL; INFILTRATION
Status: COMPLETED | OUTPATIENT
Start: 2020-07-12 | End: 2020-07-12

## 2020-07-12 RX ORDER — LIDOCAINE HYDROCHLORIDE AND EPINEPHRINE 15; 5 MG/ML; UG/ML
INJECTION, SOLUTION EPIDURAL
Status: COMPLETED | OUTPATIENT
Start: 2020-07-12 | End: 2020-07-12

## 2020-07-12 RX ORDER — IBUPROFEN 600 MG/1
600 TABLET ORAL EVERY 6 HOURS PRN
Status: DISCONTINUED | OUTPATIENT
Start: 2020-07-12 | End: 2020-07-14 | Stop reason: HOSPADM

## 2020-07-12 RX ADMIN — ROPIVACAINE HYDROCHLORIDE 6 ML: 2 INJECTION, SOLUTION EPIDURAL; INFILTRATION at 15:25

## 2020-07-12 RX ADMIN — ROPIVACAINE HYDROCHLORIDE: 2 INJECTION, SOLUTION EPIDURAL; INFILTRATION at 15:36

## 2020-07-12 RX ADMIN — SODIUM CHLORIDE, POTASSIUM CHLORIDE, SODIUM LACTATE AND CALCIUM CHLORIDE 1000 ML: 600; 310; 30; 20 INJECTION, SOLUTION INTRAVENOUS at 15:45

## 2020-07-12 RX ADMIN — FENTANYL CITRATE 100 MCG: 50 INJECTION INTRAMUSCULAR; INTRAVENOUS at 15:16

## 2020-07-12 RX ADMIN — OXYTOCIN 2 MILLI-UNITS/MIN: 10 INJECTION, SOLUTION INTRAMUSCULAR; INTRAVENOUS at 20:00

## 2020-07-12 RX ADMIN — SODIUM CHLORIDE, POTASSIUM CHLORIDE, SODIUM LACTATE AND CALCIUM CHLORIDE: 600; 310; 30; 20 INJECTION, SOLUTION INTRAVENOUS at 15:43

## 2020-07-12 RX ADMIN — ACETAMINOPHEN 650 MG: 325 TABLET, FILM COATED ORAL at 17:57

## 2020-07-12 RX ADMIN — LIDOCAINE HYDROCHLORIDE,EPINEPHRINE BITARTRATE 4 ML: 15; .005 INJECTION, SOLUTION EPIDURAL; INFILTRATION; INTRACAUDAL; PERINEURAL at 15:25

## 2020-07-12 RX ADMIN — ONDANSETRON 4 MG: 2 INJECTION INTRAMUSCULAR; INTRAVENOUS at 20:07

## 2020-07-12 RX ADMIN — ROPIVACAINE HYDROCHLORIDE: 2 INJECTION, SOLUTION EPIDURAL; INFILTRATION at 23:57

## 2020-07-12 RX ADMIN — SODIUM CHLORIDE, POTASSIUM CHLORIDE, SODIUM LACTATE AND CALCIUM CHLORIDE: 600; 310; 30; 20 INJECTION, SOLUTION INTRAVENOUS at 22:09

## 2020-07-12 SDOH — ECONOMIC STABILITY: TRANSPORTATION INSECURITY
IN THE PAST 12 MONTHS, HAS LACK OF TRANSPORTATION KEPT YOU FROM MEETINGS, WORK, OR FROM GETTING THINGS NEEDED FOR DAILY LIVING?: NO

## 2020-07-12 SDOH — ECONOMIC STABILITY: FOOD INSECURITY: WITHIN THE PAST 12 MONTHS, THE FOOD YOU BOUGHT JUST DIDN'T LAST AND YOU DIDN'T HAVE MONEY TO GET MORE.: NEVER TRUE

## 2020-07-12 SDOH — HEALTH STABILITY: MENTAL HEALTH: HOW OFTEN DO YOU HAVE A DRINK CONTAINING ALCOHOL?: NEVER

## 2020-07-12 SDOH — ECONOMIC STABILITY: FOOD INSECURITY: WITHIN THE PAST 12 MONTHS, YOU WORRIED THAT YOUR FOOD WOULD RUN OUT BEFORE YOU GOT MONEY TO BUY MORE.: NEVER TRUE

## 2020-07-12 SDOH — ECONOMIC STABILITY: TRANSPORTATION INSECURITY
IN THE PAST 12 MONTHS, HAS THE LACK OF TRANSPORTATION KEPT YOU FROM MEDICAL APPOINTMENTS OR FROM GETTING MEDICATIONS?: NO

## 2020-07-12 ASSESSMENT — PAIN SCALES - GENERAL: PAINLEVEL: 10 - WORST POSSIBLE PAIN

## 2020-07-12 ASSESSMENT — FIBROSIS 4 INDEX
FIB4 SCORE: .4137931034482758621
FIB4 SCORE: .375

## 2020-07-12 ASSESSMENT — LIFESTYLE VARIABLES: ALCOHOL_USE: NO

## 2020-07-12 NOTE — ANESTHESIA PROCEDURE NOTES
Epidural Block    Date/Time: 7/12/2020 3:25 PM  Performed by: Marilou Trevino M.D.  Authorized by: Marilou Trevino M.D.     Patient Location:  OB  Start Time:  7/12/2020 3:25 PM  End Time:  7/12/2020 3:31 PM  Reason for Block: at surgeon's request, post-op pain management, primary anesthetic and labor analgesia    patient identified, IV checked, site marked, risks and benefits discussed, surgical consent, monitors and equipment checked, pre-op evaluation, timeout performed and at patient's request    Patient Position:  Sitting  Prep: ChloraPrep    Monitoring:  Blood pressure, continuous pulse oximetry and heart rate  Approach:  Midline  Location:  L3-L4  Injection Technique:  SAMANTA air and SAMANTA saline  Skin infiltration:  Lidocaine  Strength:  1%  Dose:  2ml  Needle Type:  Tuohy  Needle Gauge:  17 G  Needle Length:  3.5 in  Loss of resistance::  4  Catheter Size:  19 G  Catheter at Skin Depth:  13  Test Dose Result:  Negative

## 2020-07-12 NOTE — PROGRESS NOTES
Dr. Trevino at bedside for epidural placement. Time out complete. Test dose at 1532. Pt tolerated procedure well. VSS. Geiger placed.

## 2020-07-12 NOTE — PROGRESS NOTES
1442- report received from RAUL Madison RN, accepted care of pt. Pt Dr. Vela, , EDC , GA 38.5. Pt presents for active labor, loss of control with UC. Denies LOF.   1452- Pt to s220.   3773- report to ONEIDA Zavala RN.

## 2020-07-12 NOTE — ANESTHESIA PREPROCEDURE EVALUATION
Relevant Problems   No relevant active problems       Physical Exam    Airway   Mallampati: II  TM distance: >3 FB       Cardiovascular   Rhythm: regular  Rate: normal     Dental - normal exam           Pulmonary    Abdominal - normal exam     Neurological              Anesthesia Plan    ASA 2       Plan - epidural   Neuraxial block will be labor analgesia      Plan Factors:   Patient was not previously instructed to abstain from smoking on day of procedure.  Patient did not smoke on day of procedure.            Pertinent diagnostic labs and testing reviewed    Informed Consent:    Anesthetic plan and risks discussed with patient.    Use of blood products discussed with: patient whom.

## 2020-07-12 NOTE — PROGRESS NOTES
0615- pt is a , 38.5 weeks gestation IUP, with c/o uc's since 0230 q5min, with loss of mucus plug and spotting. No c/o lof or dfm. efm and toco placed, vss. sve performed, 2-3/70/2. Dr. Leslie Carias called and given report, received orders to continue to monitor, recheck cervix in an hour from original check.   0881- report given to RAY Howard

## 2020-07-12 NOTE — PROGRESS NOTES
2979- Received report from ONEIDA Zavala, RN.  Pt reports UC's have become less painful since arrival.  7880- SVE- no change.  Report to Dr. Ivan Carias via phone.  Discharge orders received.  Discussed labor precautions and kick counts with pt, verbalizes understanding.  4272- Pt discharged home ambulatory in stable condition with FOB at side.

## 2020-07-13 LAB
ERYTHROCYTE [DISTWIDTH] IN BLOOD BY AUTOMATED COUNT: 51.8 FL (ref 35.9–50)
HCT VFR BLD AUTO: 32.2 % (ref 37–47)
HGB BLD-MCNC: 10.4 G/DL (ref 12–16)
HIV 1+2 AB+HIV1 P24 AG SERPL QL IA: NORMAL
MCH RBC QN AUTO: 25.6 PG (ref 27–33)
MCHC RBC AUTO-ENTMCNC: 32.3 G/DL (ref 33.6–35)
MCV RBC AUTO: 79.3 FL (ref 81.4–97.8)
PLATELET # BLD AUTO: 240 K/UL (ref 164–446)
PMV BLD AUTO: 10.6 FL (ref 9–12.9)
RBC # BLD AUTO: 4.06 M/UL (ref 4.2–5.4)
WBC # BLD AUTO: 25.1 K/UL (ref 4.8–10.8)

## 2020-07-13 PROCEDURE — 85027 COMPLETE CBC AUTOMATED: CPT

## 2020-07-13 PROCEDURE — 700111 HCHG RX REV CODE 636 W/ 250 OVERRIDE (IP): Performed by: ANESTHESIOLOGY

## 2020-07-13 PROCEDURE — 700102 HCHG RX REV CODE 250 W/ 637 OVERRIDE(OP): Performed by: OBSTETRICS & GYNECOLOGY

## 2020-07-13 PROCEDURE — 770002 HCHG ROOM/CARE - OB PRIVATE (112)

## 2020-07-13 PROCEDURE — 700111 HCHG RX REV CODE 636 W/ 250 OVERRIDE (IP)

## 2020-07-13 PROCEDURE — 304965 HCHG RECOVERY SERVICES

## 2020-07-13 PROCEDURE — 87389 HIV-1 AG W/HIV-1&-2 AB AG IA: CPT

## 2020-07-13 PROCEDURE — A9270 NON-COVERED ITEM OR SERVICE: HCPCS | Performed by: OBSTETRICS & GYNECOLOGY

## 2020-07-13 PROCEDURE — 700111 HCHG RX REV CODE 636 W/ 250 OVERRIDE (IP): Performed by: OBSTETRICS & GYNECOLOGY

## 2020-07-13 PROCEDURE — 36415 COLL VENOUS BLD VENIPUNCTURE: CPT

## 2020-07-13 PROCEDURE — 700105 HCHG RX REV CODE 258

## 2020-07-13 PROCEDURE — 10907ZC DRAINAGE OF AMNIOTIC FLUID, THERAPEUTIC FROM PRODUCTS OF CONCEPTION, VIA NATURAL OR ARTIFICIAL OPENING: ICD-10-PCS | Performed by: OBSTETRICS & GYNECOLOGY

## 2020-07-13 PROCEDURE — 59409 OBSTETRICAL CARE: CPT

## 2020-07-13 RX ORDER — SODIUM CHLORIDE, SODIUM LACTATE, POTASSIUM CHLORIDE, CALCIUM CHLORIDE 600; 310; 30; 20 MG/100ML; MG/100ML; MG/100ML; MG/100ML
INJECTION, SOLUTION INTRAVENOUS
Status: COMPLETED
Start: 2020-07-13 | End: 2020-07-13

## 2020-07-13 RX ORDER — HYDROXYZINE 50 MG/1
50 TABLET, FILM COATED ORAL EVERY 6 HOURS PRN
Status: DISCONTINUED | OUTPATIENT
Start: 2020-07-13 | End: 2020-07-13 | Stop reason: HOSPADM

## 2020-07-13 RX ORDER — MISOPROSTOL 200 UG/1
800 TABLET ORAL
Status: DISCONTINUED | OUTPATIENT
Start: 2020-07-13 | End: 2020-07-14 | Stop reason: HOSPADM

## 2020-07-13 RX ORDER — SODIUM CHLORIDE, SODIUM LACTATE, POTASSIUM CHLORIDE, CALCIUM CHLORIDE 600; 310; 30; 20 MG/100ML; MG/100ML; MG/100ML; MG/100ML
INJECTION, SOLUTION INTRAVENOUS PRN
Status: DISCONTINUED | OUTPATIENT
Start: 2020-07-13 | End: 2020-07-14 | Stop reason: HOSPADM

## 2020-07-13 RX ORDER — DOCUSATE SODIUM 100 MG/1
100 CAPSULE, LIQUID FILLED ORAL 2 TIMES DAILY PRN
Status: DISCONTINUED | OUTPATIENT
Start: 2020-07-13 | End: 2020-07-14 | Stop reason: HOSPADM

## 2020-07-13 RX ORDER — BUPIVACAINE HYDROCHLORIDE 2.5 MG/ML
INJECTION, SOLUTION EPIDURAL; INFILTRATION; INTRACAUDAL
Status: COMPLETED
Start: 2020-07-13 | End: 2020-07-13

## 2020-07-13 RX ADMIN — IBUPROFEN 600 MG: 600 TABLET ORAL at 07:25

## 2020-07-13 RX ADMIN — SODIUM CHLORIDE, POTASSIUM CHLORIDE, SODIUM LACTATE AND CALCIUM CHLORIDE: 600; 310; 30; 20 INJECTION, SOLUTION INTRAVENOUS at 04:30

## 2020-07-13 RX ADMIN — OXYCODONE HYDROCHLORIDE AND ACETAMINOPHEN 1 TABLET: 5; 325 TABLET ORAL at 08:29

## 2020-07-13 RX ADMIN — ONDANSETRON 4 MG: 2 INJECTION INTRAMUSCULAR; INTRAVENOUS at 02:19

## 2020-07-13 RX ADMIN — ACETAMINOPHEN 650 MG: 325 TABLET, FILM COATED ORAL at 20:22

## 2020-07-13 RX ADMIN — FENTANYL CITRATE 100 MCG: 50 INJECTION INTRAMUSCULAR; INTRAVENOUS at 02:40

## 2020-07-13 RX ADMIN — OXYTOCIN 125 ML/HR: 10 INJECTION, SOLUTION INTRAMUSCULAR; INTRAVENOUS at 07:26

## 2020-07-13 RX ADMIN — BUPIVACAINE HYDROCHLORIDE: 2.5 INJECTION, SOLUTION EPIDURAL; INFILTRATION; INTRACAUDAL; PERINEURAL at 02:45

## 2020-07-13 RX ADMIN — IBUPROFEN 600 MG: 600 TABLET ORAL at 20:22

## 2020-07-13 RX ADMIN — BUPIVACAINE HYDROCHLORIDE 10 ML: 2.5 INJECTION, SOLUTION EPIDURAL; INFILTRATION; INTRACAUDAL; PERINEURAL at 02:40

## 2020-07-13 RX ADMIN — IBUPROFEN 600 MG: 600 TABLET ORAL at 13:31

## 2020-07-13 ASSESSMENT — EDINBURGH POSTNATAL DEPRESSION SCALE (EPDS)
I HAVE LOOKED FORWARD WITH ENJOYMENT TO THINGS: AS MUCH AS I EVER DID
THINGS HAVE BEEN GETTING ON TOP OF ME: NO, MOST OF THE TIME I HAVE COPED QUITE WELL
I HAVE BEEN ANXIOUS OR WORRIED FOR NO GOOD REASON: YES, SOMETIMES
THE THOUGHT OF HARMING MYSELF HAS OCCURRED TO ME: NEVER
I HAVE BEEN ABLE TO LAUGH AND SEE THE FUNNY SIDE OF THINGS: AS MUCH AS I ALWAYS COULD
I HAVE FELT SAD OR MISERABLE: NO, NOT AT ALL
I HAVE BEEN SO UNHAPPY THAT I HAVE HAD DIFFICULTY SLEEPING: NOT AT ALL
I HAVE FELT SCARED OR PANICKY FOR NO GOOD REASON: NO, NOT MUCH
I HAVE BEEN SO UNHAPPY THAT I HAVE BEEN CRYING: NO, NEVER
I HAVE BLAMED MYSELF UNNECESSARILY WHEN THINGS WENT WRONG: NOT VERY OFTEN

## 2020-07-13 ASSESSMENT — PAIN SCALES - GENERAL: PAIN_LEVEL: 0

## 2020-07-13 NOTE — L&D DELIVERY NOTE
DELIVERY NOTE: 2020    Primary OB: Adriane Vela MD     20 year old  38 weeks and 6 days who came in active labor  GBS negative  Pitocin augmentation  She progressed uneventfully in labor and delivered via  at 0638  to a LBM  in OA BW pending  AS .  Good suctioning of the nose and mouth was done, cord clamped and cut and baby  handed off to the RN in attendance of further care.   AROM was clear  (+) terminal meconium   A 1st-2nd degree midline episiotomy performed due to fetal bradycardia, to facilitate delivery. Repair was done using chromic 3-0 in a normal usual sterile fashion under epidural anesthesia.  Placenta was delivered spontaneously at 0641 complete and intact with 3 vessel cord.   Estimated Blood loss- 200 cc  Uterus noted to be firm and contracted immediately postpartum.  No other cervical nor vaginal lacerations noted.  Patient tolerated the procedure well. No complications encountered.  Both patient and baby are in good condition.     ESTELLA SORIA MD

## 2020-07-13 NOTE — PROGRESS NOTES
1900 Report received from ONEIDA Zavala RN. POC discussed. Assumed care of pt.     1910 SVE 5/90/0. Dr. Ivan Carias updated.    2330 SVE. See Flowsheets for details.    0130 Pt reporting persistent back pressure. Helped pt reposition into hands and knees and provided counter pressure.     0505 SVE 10/100/+2. Provider updated.     0516 Start pushing. Pt pushing effectively with contractions.     0612 Dr. Ivan Carias called to evaluate due to persistent variable decels.    0638 Spontaneous vaginal delivery of viable baby girl per Dr. Ivan Carias. APGARs 9/9. Cord gases sent.     0700 Report given to MIRLANDE Smiley RN. POC discussed.

## 2020-07-13 NOTE — PROGRESS NOTES
0700- Report received. Care assumed. Pt freshly delivered. Immediate postpartum care provided. Breakfast ordered.  0725- Ibuprofen provided for cramping.   0740- Baby at breast. FF above umbilicus. Bleeding light. Will assist patient to void after breastfeed complete.  0829- Percocet provided. Pt feeling Ibuprofen inadequate.  0900- Up to bathroom, steady, + large void. Bleeding light.   0940- Pain improved significantly after pain meds and void. To PP via w/c. Report to RAJANI Carroll RN.

## 2020-07-13 NOTE — H&P
History and Physical      Joseline Henderson is a 20 y.o. female  at 38w6d who presents for contractions     Subjective:   positive  For CTXS.   positive Feels pain   negative for LOF  negative for vaginal bleeding.   positive for fetal movement    ROS: A comprehensive review of systems was negative.    Past Medical History:   Diagnosis Date   • Anxiety disorder    • Bipolar affective (HCC)    • Depression    • Psychiatric problem      History reviewed. No pertinent surgical history.  History reviewed. No pertinent family history.  OB History    Para Term  AB Living   1             SAB TAB Ectopic Molar Multiple Live Births                    # Outcome Date GA Lbr Oleksandr/2nd Weight Sex Delivery Anes PTL Lv   1 Current              Social History     Socioeconomic History   • Marital status:      Spouse name: Not on file   • Number of children: Not on file   • Years of education: Not on file   • Highest education level: Not on file   Occupational History   • Not on file   Social Needs   • Financial resource strain: Not on file   • Food insecurity     Worry: Never true     Inability: Never true   • Transportation needs     Medical: No     Non-medical: No   Tobacco Use   • Smoking status: Never Smoker   • Smokeless tobacco: Never Used   Substance and Sexual Activity   • Alcohol use: Never     Frequency: Never   • Drug use: Not Currently     Types: Inhaled     Comment: marijuana    • Sexual activity: Not on file   Lifestyle   • Physical activity     Days per week: Not on file     Minutes per session: Not on file   • Stress: Not on file   Relationships   • Social connections     Talks on phone: Not on file     Gets together: Not on file     Attends Restoration service: Not on file     Active member of club or organization: Not on file     Attends meetings of clubs or organizations: Not on file     Relationship status: Not on file   • Intimate partner violence     Fear of current or ex partner: Not  on file     Emotionally abused: Not on file     Physically abused: Not on file     Forced sexual activity: Not on file   Other Topics Concern   • Not on file   Social History Narrative   • Not on file     Allergies: Patient has no known allergies.    Current Facility-Administered Medications:   •  hydrOXYzine HCl (ATARAX) tablet 50 mg, 50 mg, Oral, Q6HRS PRN, Aretha Russo M.D.  •  BUPIVACAINE HCL (PF) 0.25 % INJ SOLN, , , ,   •  fentaNYL (SUBLIMAZE) injection 50 mcg, 50 mcg, Intravenous, Q HOUR PRN, Aretha Russo M.D.  •  fentaNYL (SUBLIMAZE) injection 100 mcg, 100 mcg, Intravenous, Q HOUR PRN, Aretha Russo M.D., 100 mcg at 07/12/20 1516  •  ondansetron (ZOFRAN ODT) dispertab 4 mg, 4 mg, Oral, Q6HRS PRN **OR** ondansetron (ZOFRAN) syringe/vial injection 4 mg, 4 mg, Intravenous, Q6HRS PRN, Aretha Russo M.D., 4 mg at 07/13/20 0219  •  oxytocin (PITOCIN) infusion (for induction), 0.5-20 oracio-units/min, Intravenous, Continuous, Aretha Russo M.D., Last Rate: 9 mL/hr at 07/13/20 0130, 3 oracio-units/min at 07/13/20 0130  •  oxytocin (PITOCIN) infusion (for postpartum), 2,000 mL/hr, Intravenous, Once **FOLLOWED BY** oxytocin (PITOCIN) infusion (for postpartum),  mL/hr, Intravenous, Continuous, Aretha Russo M.D.  •  miSOPROStol (CYTOTEC) tablet 800 mcg, 800 mcg, Rectal, Once PRN, Aretha Russo M.D.  •  ibuprofen (MOTRIN) tablet 600 mg, 600 mg, Oral, Q6HRS PRN, Aretha Russo M.D.  •  oxyCODONE-acetaminophen (PERCOCET) 5-325 MG per tablet 1 Tab, 1 Tab, Oral, Q4HRS PRN, Aretha Russo M.D.  •  oxyCODONE-acetaminophen (PERCOCET) 5-325 MG per tablet 2 Tab, 2 Tab, Oral, Q4HRS PRN, Aretha Russo M.D.  •  ropivacaine (NAROPIN) injection, , Epidural, Continuous, Marilou Trevino M.D.  •  ePHEDrine injection 5 mg, 5 mg, Intravenous, Q5 MIN PRN **AND**  "Notify Anesthesiologist if ephedrine given and for sustained hypotension (more than 2 minutes), , , Once **AND** For Hypotension: Place patient in left lateral tilt to achieve uterine displacement and elevate legs., , , PRN **AND** Hypotension: Oxygen Continuous, , , CONTINUOUS **AND** lactated ringers infusion (BOLUS), 250 mL, Intravenous, PRN, Marilou Trevino M.D.  •  acetaminophen (TYLENOL) tablet 650 mg, 650 mg, Oral, Q4HRS PRN, Aretha Russo M.D., 650 mg at 07/12/20 1757    Facility-Administered Medications Ordered in Other Encounters:   •  fentaNYL (SUBLIMAZE) injection, , , PRN, Marilou Trevino M.D., 100 mcg at 07/13/20 0240  •  Bupivacaine in NaCl PF injection, , Epidural, PRN, Marilou Trevino M.D., 10 mL at 07/13/20 0240    Prenatal care with Dr Vela   There are no active problems to display for this patient.    Objective:      /82   Pulse 86   Temp 36.1 °C (97 °F) (Temporal)   Ht 1.6 m (5' 2.99\")   Wt 64.9 kg (143 lb)   SpO2 99%     General:   no acute distress, alert, cooperative   Skin:   normal   HEENT:  Sclera clear, anicteric   Lungs:   CTA bilateral   Heart:   S1, S2 normal, no murmur, click, rub or gallop, regular rate and rhythm   Abdomen:   gravid, NT   EFW:  3200   Pelvis:  adequate with gynecoid pelvis   FHT:  150 BPM   Uterine Size: S=D   Presentations: Cephalic   Cervix:     Dilation: 8    Effacement: 90    Station:  0    Consistency: Soft    Position: Anterior     Lab Review  Lab:   Blood type:      Recent Results (from the past 5880 hour(s))   POC UA    Collection Time: 04/02/20 10:54 AM   Result Value Ref Range    POC Color Yellow     POC Appearance Clear     POC Glucose Negative Negative mg/dL    POC Ketones 15 (A) Negative mg/dL    POC Specific Gravity 1.015 1.005 - 1.030    POC Blood Negative Negative    POC Urine PH 7.5 5.0 - 8.0    POC Protein Negative Negative mg/dL    POC Nitrites Negative Negative    POC Leukocyte Esterase Trace (A) Negative "   POC UA    Collection Time: 04/06/20  1:51 PM   Result Value Ref Range    POC Color Yellow     POC Appearance Clear     POC Glucose Negative Negative mg/dL    POC Ketones Negative Negative mg/dL    POC Specific Gravity 1.010 1.005 - 1.030    POC Blood Negative Negative    POC Urine PH 7.0 5.0 - 8.0    POC Protein Trace (A) Negative mg/dL    POC Nitrites Negative Negative    POC Leukocyte Esterase Negative Negative   COVID/SARS CoV-2    Collection Time: 04/27/20  2:10 PM    Specimen: Nasopharyngeal; Respirate   Result Value Ref Range    COVID Order Status Yes    SARS-CoV-2, PCR (In-House)    Collection Time: 04/27/20  2:10 PM   Result Value Ref Range    SARS-CoV-2 Source NP Swab     SARS-CoV-2 by PCR Negative Negative   FETAL FIBRONECTIN    Collection Time: 04/27/20  3:00 PM   Result Value Ref Range    Fetal Fibronectin Negative    CBC WITH DIFFERENTIAL    Collection Time: 04/27/20  5:30 PM   Result Value Ref Range    WBC 11.4 (H) 4.8 - 10.8 K/uL    RBC 3.95 (L) 4.20 - 5.40 M/uL    Hemoglobin 10.1 (L) 12.0 - 16.0 g/dL    Hematocrit 31.5 (L) 37.0 - 47.0 %    MCV 79.7 (L) 81.4 - 97.8 fL    MCH 25.6 (L) 27.0 - 33.0 pg    MCHC 32.1 (L) 33.6 - 35.0 g/dL    RDW 40.1 35.9 - 50.0 fL    Platelet Count 320 164 - 446 K/uL    MPV 11.0 9.0 - 12.9 fL    Neutrophils-Polys 69.50 44.00 - 72.00 %    Lymphocytes 25.30 22.00 - 41.00 %    Monocytes 4.50 0.00 - 13.40 %    Eosinophils 0.10 0.00 - 6.90 %    Basophils 0.20 0.00 - 1.80 %    Immature Granulocytes 0.40 0.00 - 0.90 %    Nucleated RBC 0.00 /100 WBC    Neutrophils (Absolute) 7.94 (H) 2.00 - 7.15 K/uL    Lymphs (Absolute) 2.89 1.00 - 4.80 K/uL    Monos (Absolute) 0.51 0.00 - 0.85 K/uL    Eos (Absolute) 0.01 0.00 - 0.51 K/uL    Baso (Absolute) 0.02 0.00 - 0.12 K/uL    Immature Granulocytes (abs) 0.04 0.00 - 0.11 K/uL    NRBC (Absolute) 0.00 K/uL   COMP METABOLIC PANEL    Collection Time: 04/27/20  5:30 PM   Result Value Ref Range    Sodium 134 (L) 135 - 145 mmol/L    Potassium 3.4  (L) 3.6 - 5.5 mmol/L    Chloride 102 96 - 112 mmol/L    Co2 20 20 - 33 mmol/L    Anion Gap 12.0 7.0 - 16.0    Glucose 138 (H) 65 - 99 mg/dL    Bun 7 (L) 8 - 22 mg/dL    Creatinine 0.47 (L) 0.50 - 1.40 mg/dL    Calcium 9.0 8.5 - 10.5 mg/dL    AST(SGOT) 18 12 - 45 U/L    ALT(SGPT) 9 2 - 50 U/L    Alkaline Phosphatase 80 30 - 99 U/L    Total Bilirubin 0.3 0.1 - 1.5 mg/dL    Albumin 3.7 3.2 - 4.9 g/dL    Total Protein 6.7 6.0 - 8.2 g/dL    Globulin 3.0 1.9 - 3.5 g/dL    A-G Ratio 1.2 g/dL   TROPONIN    Collection Time: 20  5:30 PM   Result Value Ref Range    Troponin T <6 6 - 19 ng/L   ESTIMATED GFR    Collection Time: 20  5:30 PM   Result Value Ref Range    GFR If African American >60 >60 mL/min/1.73 m 2    GFR If Non African American >60 >60 mL/min/1.73 m 2   IRON/TOTAL IRON BIND    Collection Time: 20  5:30 PM   Result Value Ref Range    Iron 64 40 - 170 ug/dL    Total Iron Binding 597 (H) 250 - 450 ug/dL    Unsat Iron Binding 533 (H) 110 - 370 ug/dL    % Saturation 11 (L) 15 - 55 %   EKG    Collection Time: 20  6:37 PM   Result Value Ref Range    Report       [facility] Emergency Dept.    Test Date:  2020  Pt Name:    CAILIN REMY               Department: ER  MRN:        [id number]                      Room:       [facility]  Gender:     Female                       Technician: [id number]  :        [id number]                   Requested By:NAVIN CANO II  Order #:    [id number]                    Reading MD: Navin Cano II, MD    Measurements  Intervals                                Axis  Rate:       68                           P:          78  SC:         152                          QRS:        55  QRSD:       76                           T:          34  QT:         384  QTc:        409    Interpretive Statements  SINUS RHYTHM  Rate 68  Normal intervals  No ST elevation or depression. Normal twaves  Normal sinus rhythm EKG  No previous ECG available for  comparison  Electronically Signed On 4- 20:38:45 PDT by Navin Cano II, MD     POC UA    Collection Time: 06/17/20  6:51 PM   Result Value Ref Range    POC Color Yellow     POC Appearance Clear     POC Glucose Negative Negative mg/dL    POC Ketones Trace (A) Negative mg/dL    POC Specific Gravity 1.015 1.005 - 1.030    POC Blood Negative Negative    POC Urine PH 8.5 (A) 5.0 - 8.0    POC Protein 30 (A) Negative mg/dL    POC Nitrites Negative Negative    POC Leukocyte Esterase Negative Negative   GRP B STREP, BY PCR (LEIGH BROTH)    Collection Time: 06/19/20 12:51 AM    Specimen: Vaginal; Genital   Result Value Ref Range    Strep Gp B DNA PCR Negative Negative   Hold Blood Bank Specimen (Not Tested)    Collection Time: 07/12/20  2:57 PM   Result Value Ref Range    Holding Tube - Bb DONE    CBC WITH DIFFERENTIAL    Collection Time: 07/12/20  2:57 PM   Result Value Ref Range    WBC 16.3 (H) 4.8 - 10.8 K/uL    RBC 4.45 4.20 - 5.40 M/uL    Hemoglobin 11.3 (L) 12.0 - 16.0 g/dL    Hematocrit 35.9 (L) 37.0 - 47.0 %    MCV 80.7 (L) 81.4 - 97.8 fL    MCH 25.4 (L) 27.0 - 33.0 pg    MCHC 31.5 (L) 33.6 - 35.0 g/dL    RDW 50.9 (H) 35.9 - 50.0 fL    Platelet Count 290 164 - 446 K/uL    MPV 10.5 9.0 - 12.9 fL    Neutrophils-Polys 75.10 (H) 44.00 - 72.00 %    Lymphocytes 18.70 (L) 22.00 - 41.00 %    Monocytes 5.10 0.00 - 13.40 %    Eosinophils 0.20 0.00 - 6.90 %    Basophils 0.30 0.00 - 1.80 %    Immature Granulocytes 0.60 0.00 - 0.90 %    Nucleated RBC 0.00 /100 WBC    Neutrophils (Absolute) 12.25 (H) 2.00 - 7.15 K/uL    Lymphs (Absolute) 3.05 1.00 - 4.80 K/uL    Monos (Absolute) 0.83 0.00 - 0.85 K/uL    Eos (Absolute) 0.04 0.00 - 0.51 K/uL    Baso (Absolute) 0.05 0.00 - 0.12 K/uL    Immature Granulocytes (abs) 0.09 0.00 - 0.11 K/uL    NRBC (Absolute) 0.00 K/uL   COVID/SARS CoV-2 PCR    Collection Time: 07/12/20  9:05 PM    Specimen: Nasal; Respirate   Result Value Ref Range    COVID Order Status Received    SARS-CoV-2,  PCR (In-House)    Collection Time: 20  9:05 PM   Result Value Ref Range    SARS-CoV-2 Source NP Swab     SARS-CoV-2 (RdRp gene) NotDetected         Assessment:   Joseline Henderson at 38w6d  Labor status: Active phase labor.  Obstetrical history significant for There are no active problems to display for this patient.  .      Plan:     Admit to L&D  GBS negative  AROM'd - clear  Titrate pitocin  Use peanut ball  S/p epidural - comfortable  Anticipate     ESTELLA SORIA MD

## 2020-07-13 NOTE — ANESTHESIA QCDR
2019 [facility] Clinical Data Registry (for Quality Improvement)     Postoperative nausea/vomiting risk protocol (Adult = 18 yrs and Pediatric 3-17 yrs)- (430 and 463)  General inhalation anesthetic (NOT TIVA) with PONV risk factors: No  Provision of anti-emetic therapy with at least 2 different classes of agents: N/A  Patient DID NOT receive anti-emetic therapy and reason is documented in Medical Record: N/A    Multimodal Pain Management- (477)  Non-emergent surgery AND patient age >= 18: No  Use of Multimodal Pain Management, two or more drugs and/or interventions, NOT including systemic opioids:   Exception: Documented allergy to multiple classes of analgesics:     Smoking Abstinence (404)  Patient is current smoker (cigarette, pipe, e-cig, marijuanna): No  Elective Surgery:   Abstinence instructions provided prior to day of surgery:   Patient abstained from smoking on day of surgery:     Pre-Op Beta-Blocker in Isolated CABG (44)  Isolated CABG AND patient age >= 18: No  Beta-blocker admin within 24 hours of surgical incision:   Exception:of medical reason(s) for not administering beta blocker within 24 hours prior to surgical incision (e.g., not  indicated,other medical reason):     PACU assessment of acute postoperative pain prior to Anesthesia Care End- Applies to Patients Age = 18- (ABG7)  Initial PACU pain score is which of the following: < 7/10  Patient unable to report pain score: N/A    Post-anesthetic transfer of care checklist/protocol to PACU/ICU- (426 and 427)  Upon conclusion of case, patient transferred to which of the following locations: PACU/Non-ICU  Use of transfer checklist/protocol: No  Exclusion: Service Performed in Patient Hospital Room (and thus did not require transfer): Yes   Unplanned admission to ICU related to anesthesia service up through end of PACU care- (MD51)  Unplanned admission to ICU (not initially anticipated at anesthesia start time): No

## 2020-07-13 NOTE — LACTATION NOTE
Observed this baby on mom's left breast at 7 hours old. Mom had baby tummy to tummy in a cross cradle hold. Baby having periodic visible swallows. Mom denied any discomfort.    Mom states she has been leaking colostrum during third trimester. Discussed assessing diaper output in first 24 hours.     Continued lactation support offered.

## 2020-07-13 NOTE — ANESTHESIA POSTPROCEDURE EVALUATION
Patient: Joseline Henderson    Procedure Summary     Date:  07/12/20 Room / Location:      Anesthesia Start:  1525 Anesthesia Stop:  07/13/20 0638    Procedure:  Labor Epidural Diagnosis:      Scheduled Providers:   Responsible Provider:  Marilou Trevino M.D.    Anesthesia Type:  epidural ASA Status:  2          Final Anesthesia Type: epidural  Last vitals  BP   Blood Pressure: 117/70    Temp   37.1 °C (98.7 °F)    Pulse   Pulse: 96   Resp        SpO2   99 %      Anesthesia Post Evaluation    Patient location during evaluation: bedside  Patient participation: complete - patient participated  Level of consciousness: awake and alert  Pain score: 0    Airway patency: patent  Anesthetic complications: no  Cardiovascular status: adequate  Respiratory status: acceptable  Hydration status: acceptable    PONV: none

## 2020-07-13 NOTE — ANESTHESIA TIME REPORT
Anesthesia Start and Stop Event Times     Date Time Event    7/12/2020 1525 Ready for Procedure     1525 Anesthesia Start    7/13/2020 0638 Anesthesia Stop        Responsible Staff  07/12/20 to 07/13/20    Name Role Begin End    Marilou Trevino M.D. Anesth 1525 0660        Preop Diagnosis (Free Text):  Pre-op Diagnosis             Preop Diagnosis (Codes):    Post op Diagnosis  Pregnancy      Premium Reason  E. Weekend    Comments:

## 2020-07-14 VITALS
DIASTOLIC BLOOD PRESSURE: 81 MMHG | HEIGHT: 63 IN | SYSTOLIC BLOOD PRESSURE: 117 MMHG | RESPIRATION RATE: 16 BRPM | HEART RATE: 68 BPM | WEIGHT: 143 LBS | BODY MASS INDEX: 25.34 KG/M2 | OXYGEN SATURATION: 96 % | TEMPERATURE: 98.6 F

## 2020-07-14 PROCEDURE — A9270 NON-COVERED ITEM OR SERVICE: HCPCS | Performed by: OBSTETRICS & GYNECOLOGY

## 2020-07-14 PROCEDURE — 700102 HCHG RX REV CODE 250 W/ 637 OVERRIDE(OP): Performed by: OBSTETRICS & GYNECOLOGY

## 2020-07-14 PROCEDURE — 700112 HCHG RX REV CODE 229: Performed by: OBSTETRICS & GYNECOLOGY

## 2020-07-14 RX ORDER — PSEUDOEPHEDRINE HCL 30 MG
100 TABLET ORAL 2 TIMES DAILY PRN
Qty: 60 CAP | COMMUNITY
Start: 2020-07-14 | End: 2022-04-30

## 2020-07-14 RX ORDER — ACETAMINOPHEN 325 MG/1
650 TABLET ORAL EVERY 4 HOURS PRN
Qty: 30 TAB | Refills: 0 | COMMUNITY
Start: 2020-07-14 | End: 2022-04-30

## 2020-07-14 RX ORDER — IBUPROFEN 600 MG/1
600 TABLET ORAL EVERY 6 HOURS PRN
Qty: 30 TAB | Refills: 2 | Status: SHIPPED | OUTPATIENT
Start: 2020-07-14 | End: 2022-04-30

## 2020-07-14 RX ADMIN — IBUPROFEN 600 MG: 600 TABLET ORAL at 02:51

## 2020-07-14 RX ADMIN — ACETAMINOPHEN 650 MG: 325 TABLET, FILM COATED ORAL at 08:26

## 2020-07-14 RX ADMIN — IBUPROFEN 600 MG: 600 TABLET ORAL at 12:42

## 2020-07-14 RX ADMIN — DOCUSATE SODIUM 100 MG: 100 CAPSULE, LIQUID FILLED ORAL at 12:42

## 2020-07-14 NOTE — CARE PLAN
Problem: Altered physiologic condition related to immediate post-delivery state and potential for bleeding/hemorrhage  Goal: Patient physiologically stable as evidenced by normal lochia, palpable uterine involution and vital signs within normal limits  Outcome: MET  Note: Fundus firm, light lochia noted. Slight edema noted to perineum, tucks, spray, and ice pack in use. Patient able to ambulate and provide self perineal care.  Monitoring and assessing patient vital signs.       Problem: Potential anxiety related to difficulty adapting to parental role  Goal: Patient will verbalize and demonstrate effective bonding and parenting behavior  Outcome: MET  Note: Patient involved in infant care. Patient verbalizes and demonstrate understanding of infant care. Patient demonstrate effective bonding with infant. Patient encouraged to notify RN when needing assistance.

## 2020-07-14 NOTE — CARE PLAN
Problem: Alteration in comfort related to episiotomy, vaginal repair and/or after birth pains  Goal: Patient verbalizes acceptable pain level  Outcome: PROGRESSING AS EXPECTED  Note: Pain controlled with prn medications per mar. Pt will call to request pain medications. Will offer pain medications as they become available. Pain management expectations discussed with pt, plan made for the day regarding how to address pain.      Problem: Potential knowledge deficit related to lack of understanding of self and  care  Goal: Patient will demonstrate ability to care for self and infant  Outcome: PROGRESSING AS EXPECTED  Note: Pt able to care for self and infant.

## 2020-07-14 NOTE — PROGRESS NOTES
Assumed care of patient, report at bedside from,Anni BELL. Assessment completed and WDL. Call light within reach, discussed plan of care, denies pain at the moment. Will continue to monitor.

## 2020-07-14 NOTE — LACTATION NOTE
This note was copied from a baby's chart.  Infant is currently in the nursery for circumcision. Teach that sleepiness is normal following procedure and to offer the breast with skin to skin as soon as he returns and @ 2-3 hours if still sleepy using hand expression of colostrum to stimulate. Teach to feed on cue a minimum of 8x/24 hours using skin to skin with cluster feeding day 2-3 as normal to signal the milk in to her infant's needs. MOB reports that infant has been latching without difficulty and breastfeeding well. INJOY BF section added to phone with instructions for navigation to access education.  Education given for recommenddation against THC use when breastfeeding with discussion. Info given for availability of outpatient lactation support with encouragement to attend the BF Zoom meetings.     Breastfeeding Plan:     Feed on cue a minimum of 8x/24 hours with cluster feeding as normal day 2-3 or until the milk comes in and during growth spurts.      Teach signs that infant is getting enough as 4-6 wet diapers by day four when the milk comes in, @ 1 week 6-8 voids there after, and increasing number of stools each day transitioning to bright yellow seedy loose stools.     Follow up with PEDS PCP as scheduled and call for a 1:1 lactation consultation if any problems develop. Encouraged to attend BF Zoom meetings on Fridays.

## 2020-07-14 NOTE — PROGRESS NOTES
"Report received from Nora BELL. Assumed Patient care. Patient appears calm and in no acute distress. Labs and orders reviewed. Assessment completed. Fundus firm, light lochia noted.Patient educated on keeping infant bundled up and/or skin-to-skin, safe sleep policy for infant, and infant feeding frequency, patient verbalizes understanding.  Plan of care discussed with patient; questions have been answered at this time. Patient needs have been met at this time. Patient encouraged to call when needing assistance. Call light within reach. Rounding in place. /81   Pulse 68   Temp 37 °C (98.6 °F) (Temporal)   Resp 16   Ht 1.6 m (5' 2.99\")   Wt 64.9 kg (143 lb)   SpO2 96%   Breastfeeding Yes   BMI 25.34 kg/m² .   "

## 2020-07-14 NOTE — PROGRESS NOTES
Discharge education and paperwork reviewed, patient verbalizes understanding. Discharge paperwork signed.

## 2020-07-14 NOTE — PROGRESS NOTES
PPD # 1 S/P     Doing well.  Voiding, ambulating, breast feeding.  Denies heavy bleeding.    AF, VS wnl  AAO, NAD  Abd: S/NT/ND, fundus firm  Calves: NT, no edema    Hct 32    Plan:  Requests discharge home.  Home today.  Instructions reviewed.  F/u 6 weeks.  Rx for motrin.  Pelvic rest 6 weeks.

## 2020-07-14 NOTE — DISCHARGE INSTRUCTIONS
POSTPARTUM DISCHARGE INSTRUCTIONS FOR MOM    YOB: 2000   Age: 20 y.o.               Admit Date: 2020     Discharge Date: 2020  Attending Doctor:  Adriane Vela M.D.                  Allergies:  Patient has no known allergies.    Discharged to home by car. Discharged via wheelchair, hospital escort: Yes.  Special equipment needed: Not Applicable  Belongings with: Personal  Be sure to schedule a follow-up appointment with your primary care doctor or any specialists as instructed.     Discharge Plan:   Diet Plan: Discussed  Activity Level: Discussed  Confirmed Follow up Appointment: Patient to Call and Schedule Appointment  Confirmed Symptoms Management: Discussed  Medication Reconciliation Updated: No (Comments)    REASONS TO CALL YOUR OBSTETRICIAN:  1.   Persistent fever or shaking chills (Temperature higher than 100.4)  2.   Heavy bleeding (soaking more than 1 pad per hour); Passing clots  3.   Foul odor from vagina  4.   Mastitis (Breast infection; breast pain, chills, fever, redness)  5.   Urinary pain, burning or frequency  6.   Episiotomy infection  7.   Abdominal incision infection  8.   Severe depression longer than 24 hours    HAND WASHING  · Prior to handling the baby.  · Before breastfeeding or bottle feeding baby.  · After using the bathroom or changing the baby's diaper.    WOUND CARE  Ask your physician for additional care instructions.  In general:    ·  Incision:      · Keep clean and dry.    · Do NOT lift anything heavier than your baby for up to 6 weeks.    · There should not be any opening or pus.      VAGINAL CARE  · Nothing inside vagina for 6 weeks: no sexual intercourse, tampons or douching.  · Bleeding may continue for 2-4 weeks.  Amount may vary.    · Call your physician for heavy bleeding which means soaking more than 1 pad per hour    BIRTH CONTROL  · It is possible to become pregnant at any time after delivery and while breastfeeding.  · Plan to discuss a  "method of birth control with your physician at your follow up visit. visit.    DIET AND ELIMINATION  · Eating more fiber (bran cereal, fruits, and vegetables) and drinking plenty of fluids will help to avoid constipation.  · Urinary frequency after childbirth is normal.    POSTPARTUM BLUES  During the first few days after birth, you may experience a sense of the \"blues\" which may include impatience, irritability or even crying.  These feeling come and go quickly.  However, as many as 1 in 10 women experience emotional symptoms known as postpartum depression.    Postpartum depression:  May start as early as the second or third day after delivery or take several weeks or months to develop.  Symptoms of \"blues\" are present, but are more intense:  Crying spells; loss of appetite; feelings of hopelessness or loss of control; fear of touching the baby; over concern or no concern at all about the baby; little or no concern about your own appearance/caring for yourself; and/or inability to sleep or excessive sleeping.  Contact your physician if you are experiencing any of these symptoms.    Crisis Hotline:  · [place] Crisis Hotline:  9-162-YEWNEOX  Or 1-268.446.2059  · [place] Crisis Hotline:  1-200.595.3390  Or 849-177-5674    PREVENTING SHAKEN BABY:  If you are angry or stressed, PUT THE BABY IN THE CRIB, step away, take some deep breaths, and wait until you are calm to care for the baby.  DO NOT SHAKE THE BABY.  You are not alone, call a supporter for help.    · Crisis Call Center 24/7 crisis line 067-815-0107 or 1-139.988.3725  · You can also text them, text \"ANSWER\" to 531960    QUIT SMOKING/TOBACCO USE:  I understand the use of any tobacco products increases my chance of suffering from future heart disease and could cause other illnesses which may shorten my life. Quitting the use of tobacco products is the single most important thing I can do to improve my health. For further information on smoking / tobacco " cessation call a Toll Free Quit Line at 1-373.675.9305 (*[employer] [place]) or 1-802.707.4876 (American Lung Association) or you can access the web based program at www.lungusa.org.    · [place] Tobacco Users Help Line:  (607) 125-5300       Toll Free: 1-219.609.9740  · Quit Tobacco Program [facility] (208)815-6132    DEPRESSION / SUICIDE RISK:  As you are discharged from this [facility], it is important to learn how to keep safe from harming yourself.    Recognize the warning signs:  · Abrupt changes in personality, positive or negative- including increase in energy   · Giving away possessions  · Change in eating patterns- significant weight changes-  positive or negative  · Change in sleeping patterns- unable to sleep or sleeping all the time   · Unwillingness or inability to communicate  · Depression  · Unusual sadness, discouragement and loneliness  · Talk of wanting to die  · Neglect of personal appearance   · Rebelliousness- reckless behavior  · Withdrawal from people/activities they love  · Confusion- inability to concentrate     If you or a loved one observes any of these behaviors or has concerns about self-harm, here's what you can do:  · Talk about it- your feelings and reasons for harming yourself  · Remove any means that you might use to hurt yourself (examples: pills, rope, extension cords, firearm)  · Get professional help from the community (Mental Health, Substance Abuse, psychological counseling)  · Do not be alone:Call your Safe Contact- someone whom you trust who will be there for you.  · Call your local CRISIS HOTLINE 973-1733 or 234-535-0727  · Call your local Children's Mobile Crisis Response Team [place] (453) 640-1331 or www.Wevod  · Call the toll free National Suicide Prevention Hotlines   · National Suicide Prevention Lifeline 694-294-PZGC (5205)  · National Hope Line Network 800-SUICIDE (676-5090)    DISCHARGE SURVEY:  Thank you  for choosing [facility].  We hope we provided you with very good care.  You may be receiving a survey in the mail.  Please fill it out.  Your opinion is valuable to us.    ADDITIONAL EDUCATIONAL MATERIALS GIVEN TO PATIENT:        My signature on this form indicates that:  1.  I have reviewed and understand the above information  2.  My questions regarding this information have been answered to my satisfaction.  3.  I have formulated a plan with my discharge nurse to obtain my prescribed medication for home.

## 2020-07-14 NOTE — CONSULTS
Lactation note:  Initial visit. Mother had infant latched in cradle hold to the left breast, she denies pain with latch. Infant's lips are flared out, and swallows audible. Educated on  normal  feeding behaviors and normal course of breastfeeding at 12-24-48-72 hours, and what to expect. Discussed importance of offering breast with feeding cues or at least 8 or more feeds in a  24 hour period, and even if infant shows no interest, can do hand expression into infant's lips. Showed MOB how to hand express colostrum. Drops easily seen.  Encouraged to continue doing skin to skin. Discussed indicators of an ideal deep latch, voiding and stooling patterns, feeding cues, stomach size, and importance of establishing milk supply with frequency of feedings.     Plan for tonight is to continue to offer breast first, if not latching well, can hand express colostrum, and refeed to infant.    Information and phone numbers to the [facility] provided  & invited to breastfeeding circles.    Parents also want circumcision. Discussed possible effects on wakefulness of infant for feedings after the procedure. Encouraged to hand express if infant is too sleepy after circumcision to latch.    MOB has no other questions or concerns regarding breastfeeding. Encouraged to call for assistance as needed.

## 2020-07-14 NOTE — DISCHARGE PLANNING
Discharge Planning Assessment Post Partum    Reason for Referral: Hx of depression, anxiety, and bipolar.   Address: [address] [place] [address] [place] [address]  Type of Living Situation: Lives in house with spouse and 1 roommate.    Mom Diagnosis: Pregnancy   Baby Diagnosis: n/a  Primary Language: English     Name of Baby: GUERO Lopez   Father of the Baby: Pan Lopez   Involved in baby’s care? Yes  Contact Information: [phone]    Prenatal Care: Yes   Mom's PCP: None currently - states she plans to establish with new PCP now that she has insurance.   PCP for new baby: Sana Vergara MD     Support System: Both Mother and Father's parents live down the street and are supportive, planning to help with baby as needed.   Coping/Bonding between mother & baby: Appropriate   Source of Feeding: Breastfeeding   Supplies for Infant: All necessary supplies in place including carseat, crib, clothes, diapers, etc.  Mother states no resources needed at this time.     Mom's Insurance: [employer]   Baby Covered on Insurance:Yes - plan to enroll baby within 30 days of birth   Mother Employed/School: No - financially supported by spouse.  Plans to stay home with baby.   Other children in the home/names & ages: No prior children     Financial Hardship/Income: Father of baby working and financially supporting mother and baby.  No financial hardship noted.     Mom's Mental status: AOx4 - normal affect and appropriate   Services used prior to admit: None     CPS History: None   Psychiatric History: Hx of anxiety, depression, and bipolar.  States she has not been on medication since 2018 and is stable.  No concerns noted by spouse.  Mother states she plans to re-establish with psychiatrist with her new insurance and spouse agreeable to assist in monitoring for new symptoms.    Domestic Violence History: None   Drug/ETOH History: None    Resources Provided: [facility] Anxiety& Mood contact information as well as information on [facility]  Referrals  Made: None      Clearance for Discharge: Yes.  Mother and Baby cleared from  perspective.   Bedside RN updated.      Ongoing Plan: None - mother and baby expected to d/c home today.

## 2020-08-04 NOTE — DISCHARGE SUMMARY
DATE OF HOSPITAL ADMISSION:  07/12/2020    DATE OF DISCHARGE:  07/14/2020    DIAGNOSIS ON ADMISSION:  Intrauterine pregnancy at 38 and 6 with active labor.    DIAGNOSIS ON DISCHARGE:  Status post normal spontaneous vaginal delivery.    BRIEF HISTORY:  This patient with uncomplicated prenatal care in my office who   was admitted by my on-call partner, Dr. Mitzi Russo on 07/12.    She proceeded to a normal spontaneous vaginal delivery of a male infant with   Apgars of 9 and 9.  Her postpartum course was uncomplicated and she was   discharged home on postpartum day #1 with routine postoperative instructions,   followup and precautions.       ____________________________________     MD BRIANNA Gonzalez / RAY    DD:  08/03/2020 07:41:04  DT:  08/03/2020 18:15:17    D#:  [id number]  Job#:  [id number]

## 2020-10-19 ENCOUNTER — HOSPITAL ENCOUNTER (OUTPATIENT)
Dept: LAB | Facility: MEDICAL CENTER | Age: 20
End: 2020-10-19
Attending: OBSTETRICS & GYNECOLOGY
Payer: MEDICAID

## 2020-10-19 LAB — B-HCG SERPL-ACNC: <1 MIU/ML (ref 0–5)

## 2020-10-19 PROCEDURE — 36415 COLL VENOUS BLD VENIPUNCTURE: CPT

## 2020-10-19 PROCEDURE — 84702 CHORIONIC GONADOTROPIN TEST: CPT

## 2021-08-30 ENCOUNTER — APPOINTMENT (OUTPATIENT)
Dept: URGENT CARE | Facility: CLINIC | Age: 21
End: 2021-08-30

## 2021-09-01 ENCOUNTER — OFFICE VISIT (OUTPATIENT)
Dept: URGENT CARE | Facility: CLINIC | Age: 21
End: 2021-09-01
Payer: MEDICAID

## 2021-09-01 ENCOUNTER — HOSPITAL ENCOUNTER (OUTPATIENT)
Dept: RADIOLOGY | Facility: MEDICAL CENTER | Age: 21
End: 2021-09-01
Attending: PHYSICIAN ASSISTANT
Payer: MEDICAID

## 2021-09-01 VITALS
DIASTOLIC BLOOD PRESSURE: 60 MMHG | SYSTOLIC BLOOD PRESSURE: 112 MMHG | BODY MASS INDEX: 21.95 KG/M2 | OXYGEN SATURATION: 97 % | TEMPERATURE: 97.2 F | HEIGHT: 64 IN | WEIGHT: 128.6 LBS | RESPIRATION RATE: 16 BRPM | HEART RATE: 83 BPM

## 2021-09-01 DIAGNOSIS — R11.0 NAUSEA: ICD-10-CM

## 2021-09-01 DIAGNOSIS — R10.11 RIGHT UPPER QUADRANT ABDOMINAL PAIN: ICD-10-CM

## 2021-09-01 DIAGNOSIS — R19.5 LOOSE STOOLS: ICD-10-CM

## 2021-09-01 DIAGNOSIS — Z00.00 HEALTHCARE MAINTENANCE: ICD-10-CM

## 2021-09-01 LAB
APPEARANCE UR: CLEAR
BILIRUB UR STRIP-MCNC: NEGATIVE MG/DL
COLOR UR AUTO: YELLOW
GLUCOSE UR STRIP.AUTO-MCNC: NEGATIVE MG/DL
INT CON NEG: NEGATIVE
INT CON POS: POSITIVE
KETONES UR STRIP.AUTO-MCNC: NEGATIVE MG/DL
LEUKOCYTE ESTERASE UR QL STRIP.AUTO: NEGATIVE
NITRITE UR QL STRIP.AUTO: NEGATIVE
PH UR STRIP.AUTO: 7.5 [PH] (ref 5–8)
POC URINE PREGNANCY TEST: NEGATIVE
PROT UR QL STRIP: NEGATIVE MG/DL
RBC UR QL AUTO: NEGATIVE
SP GR UR STRIP.AUTO: 1.02
UROBILINOGEN UR STRIP-MCNC: 0.2 MG/DL

## 2021-09-01 PROCEDURE — 76705 ECHO EXAM OF ABDOMEN: CPT

## 2021-09-01 PROCEDURE — 81002 URINALYSIS NONAUTO W/O SCOPE: CPT | Performed by: PHYSICIAN ASSISTANT

## 2021-09-01 PROCEDURE — 81025 URINE PREGNANCY TEST: CPT | Performed by: PHYSICIAN ASSISTANT

## 2021-09-01 PROCEDURE — 99204 OFFICE O/P NEW MOD 45 MIN: CPT | Mod: 25 | Performed by: PHYSICIAN ASSISTANT

## 2021-09-01 ASSESSMENT — ENCOUNTER SYMPTOMS
CONSTIPATION: 0
ABDOMINAL PAIN: 1
FLANK PAIN: 0
DIARRHEA: 1
HEARTBURN: 1
NAUSEA: 1
VOMITING: 1

## 2021-09-01 ASSESSMENT — FIBROSIS 4 INDEX: FIB4 SCORE: .525

## 2021-09-01 NOTE — PROGRESS NOTES
Subjective:   Joseline Henderson is a 21 y.o. female who presents today with   Chief Complaint   Patient presents with   • Abdominal Pain     (R) side, diarrhea, x1 month, got worse the last 4 days        Abdominal Pain  This is a new problem. The current episode started more than 1 month ago. The onset quality is gradual. The problem occurs constantly. The problem has been gradually worsening. The pain is located in the RUQ. The pain is mild. The quality of the pain is sharp and cramping. The abdominal pain radiates to the RUQ and epigastric region. Associated symptoms include diarrhea, nausea and vomiting. Pertinent negatives include no constipation, dysuria, frequency or hematuria. The pain is aggravated by eating. The pain is relieved by nothing. She has tried nothing for the symptoms. The treatment provided no relief.   Over the last couple months patient has noticed some right-sided abdominal pain that comes and goes randomly.  She states that it sometimes radiates towards her stomach.  Has been getting worse over the last 4 days.  Stools have become looser.  No blood in the stool reported.  Patient denies any alcohol use but does state she has gone through lots of stress and anxiety over the last year.    PMH:  has a past medical history of Anxiety disorder, Bipolar affective (HCC), Depression, and Psychiatric problem.  MEDS:   Current Outpatient Medications:   •  acetaminophen (TYLENOL) 325 MG Tab, Take 2 Tabs by mouth every four hours as needed., Disp: 30 Tab, Rfl: 0  •  ibuprofen (MOTRIN) 600 MG Tab, Take 1 Tab by mouth every 6 hours as needed (For cramping after delivery; do not give if patient is receiving ketorolac (Toradol))., Disp: 30 Tab, Rfl: 2  •  docusate sodium 100 MG Cap, Take 100 mg by mouth 2 times a day as needed for Constipation., Disp: 60 Cap, Rfl:   •  Prenatal MV-Min-Fe Fum-FA-DHA (PRENATAL 1 PO), Take  by mouth., Disp: , Rfl:   ALLERGIES: No Known Allergies  SURGHX: No past surgical  "history on file.  SOCHX:  reports that she has never smoked. She has never used smokeless tobacco. She reports previous drug use. Drug: Inhaled. She reports that she does not drink alcohol.  FH: Reviewed with patient, not pertinent to this visit.       Review of Systems   Gastrointestinal: Positive for abdominal pain, diarrhea, heartburn, nausea and vomiting. Negative for constipation.   Genitourinary: Negative for dysuria, flank pain, frequency, hematuria and urgency.        Objective:   /60 (BP Location: Left arm, Patient Position: Sitting, BP Cuff Size: Adult long)   Pulse 83   Temp 36.2 °C (97.2 °F) (Temporal)   Resp 16   Ht 1.626 m (5' 4\")   Wt 58.3 kg (128 lb 9.6 oz)   SpO2 97%   BMI 22.07 kg/m²   Physical Exam  Vitals and nursing note reviewed.   Constitutional:       General: She is not in acute distress.     Appearance: Normal appearance. She is well-developed. She is not ill-appearing or toxic-appearing.   HENT:      Head: Normocephalic and atraumatic.      Right Ear: Hearing normal.      Left Ear: Hearing normal.   Eyes:      Conjunctiva/sclera: Conjunctivae normal.   Cardiovascular:      Rate and Rhythm: Normal rate and regular rhythm.      Heart sounds: Normal heart sounds.   Pulmonary:      Effort: Pulmonary effort is normal.      Breath sounds: Normal breath sounds.   Abdominal:      General: Bowel sounds are normal. There is no distension.      Tenderness: There is abdominal tenderness in the right upper quadrant. There is no right CVA tenderness, left CVA tenderness, guarding or rebound. Positive signs include Campbell's sign. Negative signs include McBurney's sign.   Musculoskeletal:      Comments: Normal movement in all 4 extremities   Skin:     General: Skin is warm and dry.   Neurological:      Mental Status: She is alert.      Coordination: Coordination normal.   Psychiatric:         Mood and Affect: Mood normal.       US RUQ  FINDINGS:     The liver measures 16.19 cm. The liver is " normal in contour. There is no evidence of solid mass lesion.     The gallbladder is normal without wall thickening or calculi.  The gallbladder wall thickness measures 2.20 mm. There is no pericholecystic fluid.  The common duct measures 2.90 mm in diameter.     The visualized pancreas is unremarkable.  The visualized aorta is normal in caliber.     Intrahepatic IVC is patent.     The portal vein is patent with hepatopetal flow. The MPV measures 0.8 cm.     The right kidney measures 11.33 cm. The right kidney has normal cortical size and echotexture. There is no hydronephrosis or renal calculi.     There is no ascites.     IMPRESSION:     1. Unremarkable abdominal ultrasound.  Results for orders placed or performed in visit on 09/01/21   POCT Urinalysis   Result Value Ref Range    POC Color Yellow Negative    POC Appearance Clear Negative    POC Leukocyte Esterase Negative Negative    POC Nitrites Negative Negative    POC Urobiligen 0.2 Negative (0.2) mg/dL    POC Protein Negative Negative mg/dL    POC Urine PH 7.5 5.0 - 8.0    POC Blood Negative Negative    POC Specific Gravity 1.020 <1.005 - >1.030    POC Ketones Negative Negative mg/dL    POC Bilirubin Negative Negative mg/dL    POC Glucose Negative Negative mg/dL   POCT Pregnancy   Result Value Ref Range    POC Urine Pregnancy Test Negative Negative    Internal Control Positive Positive     Internal Control Negative Negative        Assessment/Plan:   Assessment    1. Right upper quadrant abdominal pain  - POCT Urinalysis  - POCT Pregnancy  - US-RUQ; Future  - CBC WITH DIFFERENTIAL; Future  - Comp Metabolic Panel; Future  - REFERRAL TO GASTROENTEROLOGY    2. Nausea  - H.PYLORI STOOL ANTIGEN; Future    3. Loose stools  - H.PYLORI STOOL ANTIGEN; Future  - REFERRAL TO GASTROENTEROLOGY    4. Healthcare maintenance  - AMB REFERRAL TO ESTABLISH WITH [facility] PCP  Recommend we get labs to check on her liver function.  Will also obtain right upper quadrant ultrasound.   Will rule out H. pylori as well.  Follow-up with GI specialist as needed.  Establish care with a primary care.  Differential diagnosis, natural history, supportive care, and indications for immediate follow-up discussed.   Patient given instructions and understanding of medications and treatment.    If not improving in 3-5 days, F/U with PCP or return to UC if symptoms worsen.  Strict ER precautions given.  Patient agreeable to plan.    Please note that this dictation was created using voice recognition software. I have made every reasonable attempt to correct obvious errors, but I expect that there are errors of grammar and possibly content that I did not discover before finalizing the note.    Gerardo Silver PA-C

## 2022-03-30 ENCOUNTER — PHARMACY VISIT (OUTPATIENT)
Dept: PHARMACY | Facility: MEDICAL CENTER | Age: 22
End: 2022-03-30
Payer: COMMERCIAL

## 2022-03-30 ENCOUNTER — APPOINTMENT (OUTPATIENT)
Dept: RADIOLOGY | Facility: MEDICAL CENTER | Age: 22
End: 2022-03-30
Attending: EMERGENCY MEDICINE
Payer: MEDICAID

## 2022-03-30 ENCOUNTER — HOSPITAL ENCOUNTER (EMERGENCY)
Facility: MEDICAL CENTER | Age: 22
End: 2022-03-30
Attending: EMERGENCY MEDICINE
Payer: MEDICAID

## 2022-03-30 VITALS
HEART RATE: 81 BPM | DIASTOLIC BLOOD PRESSURE: 86 MMHG | HEIGHT: 64 IN | BODY MASS INDEX: 22.17 KG/M2 | OXYGEN SATURATION: 97 % | RESPIRATION RATE: 18 BRPM | SYSTOLIC BLOOD PRESSURE: 142 MMHG | TEMPERATURE: 98 F | WEIGHT: 129.85 LBS

## 2022-03-30 DIAGNOSIS — R10.13 EPIGASTRIC ABDOMINAL PAIN: ICD-10-CM

## 2022-03-30 DIAGNOSIS — K29.00 ACUTE GASTRITIS WITHOUT HEMORRHAGE, UNSPECIFIED GASTRITIS TYPE: ICD-10-CM

## 2022-03-30 LAB
ALBUMIN SERPL BCP-MCNC: 4.4 G/DL (ref 3.2–4.9)
ALBUMIN/GLOB SERPL: 1.6 G/DL
ALP SERPL-CCNC: 76 U/L (ref 30–99)
ALT SERPL-CCNC: 37 U/L (ref 2–50)
ANION GAP SERPL CALC-SCNC: 13 MMOL/L (ref 7–16)
ANISOCYTOSIS BLD QL SMEAR: ABNORMAL
APPEARANCE UR: CLEAR
AST SERPL-CCNC: 38 U/L (ref 12–45)
BASOPHILS # BLD AUTO: 0 % (ref 0–1.8)
BASOPHILS # BLD: 0 K/UL (ref 0–0.12)
BILIRUB SERPL-MCNC: 0.4 MG/DL (ref 0.1–1.5)
BILIRUB UR QL STRIP.AUTO: NEGATIVE
BUN SERPL-MCNC: 13 MG/DL (ref 8–22)
CALCIUM SERPL-MCNC: 9.4 MG/DL (ref 8.5–10.5)
CHLORIDE SERPL-SCNC: 105 MMOL/L (ref 96–112)
CO2 SERPL-SCNC: 21 MMOL/L (ref 20–33)
COLOR UR: YELLOW
CREAT SERPL-MCNC: 0.55 MG/DL (ref 0.5–1.4)
EOSINOPHIL # BLD AUTO: 0 K/UL (ref 0–0.51)
EOSINOPHIL NFR BLD: 0 % (ref 0–6.9)
ERYTHROCYTE [DISTWIDTH] IN BLOOD BY AUTOMATED COUNT: 43.1 FL (ref 35.9–50)
GFR SERPLBLD CREATININE-BSD FMLA CKD-EPI: 133 ML/MIN/1.73 M 2
GLOBULIN SER CALC-MCNC: 2.8 G/DL (ref 1.9–3.5)
GLUCOSE SERPL-MCNC: 107 MG/DL (ref 65–99)
GLUCOSE UR STRIP.AUTO-MCNC: NEGATIVE MG/DL
HCG SERPL QL: NEGATIVE
HCT VFR BLD AUTO: 42.4 % (ref 37–47)
HGB BLD-MCNC: 14.6 G/DL (ref 12–16)
KETONES UR STRIP.AUTO-MCNC: 80 MG/DL
LEUKOCYTE ESTERASE UR QL STRIP.AUTO: NEGATIVE
LIPASE SERPL-CCNC: 22 U/L (ref 11–82)
LYMPHOCYTES # BLD AUTO: 1.78 K/UL (ref 1–4.8)
LYMPHOCYTES NFR BLD: 18.4 % (ref 22–41)
MACROCYTES BLD QL SMEAR: ABNORMAL
MANUAL DIFF BLD: NORMAL
MCH RBC QN AUTO: 29.1 PG (ref 27–33)
MCHC RBC AUTO-ENTMCNC: 34.4 G/DL (ref 33.6–35)
MCV RBC AUTO: 84.5 FL (ref 81.4–97.8)
MICRO URNS: ABNORMAL
MONOCYTES # BLD AUTO: 0.17 K/UL (ref 0–0.85)
MONOCYTES NFR BLD AUTO: 1.8 % (ref 0–13.4)
MORPHOLOGY BLD-IMP: NORMAL
NEUTROPHILS # BLD AUTO: 7.74 K/UL (ref 2–7.15)
NEUTROPHILS NFR BLD: 79.8 % (ref 44–72)
NITRITE UR QL STRIP.AUTO: NEGATIVE
NRBC # BLD AUTO: 0 K/UL
NRBC BLD-RTO: 0 /100 WBC
PH UR STRIP.AUTO: 6 [PH] (ref 5–8)
PLATELET # BLD AUTO: 327 K/UL (ref 164–446)
PLATELET BLD QL SMEAR: NORMAL
PMV BLD AUTO: 10.4 FL (ref 9–12.9)
POTASSIUM SERPL-SCNC: 3.7 MMOL/L (ref 3.6–5.5)
PROT SERPL-MCNC: 7.2 G/DL (ref 6–8.2)
PROT UR QL STRIP: NEGATIVE MG/DL
RBC # BLD AUTO: 5.02 M/UL (ref 4.2–5.4)
RBC # URNS HPF: NORMAL /HPF
RBC BLD AUTO: PRESENT
RBC UR QL AUTO: ABNORMAL
SODIUM SERPL-SCNC: 139 MMOL/L (ref 135–145)
SP GR UR STRIP.AUTO: 1.02
UROBILINOGEN UR STRIP.AUTO-MCNC: 1 MG/DL
WBC # BLD AUTO: 9.7 K/UL (ref 4.8–10.8)
WBC #/AREA URNS HPF: NORMAL /HPF

## 2022-03-30 PROCEDURE — 84703 CHORIONIC GONADOTROPIN ASSAY: CPT

## 2022-03-30 PROCEDURE — 96374 THER/PROPH/DIAG INJ IV PUSH: CPT

## 2022-03-30 PROCEDURE — 83690 ASSAY OF LIPASE: CPT

## 2022-03-30 PROCEDURE — 36415 COLL VENOUS BLD VENIPUNCTURE: CPT

## 2022-03-30 PROCEDURE — 81001 URINALYSIS AUTO W/SCOPE: CPT

## 2022-03-30 PROCEDURE — 85007 BL SMEAR W/DIFF WBC COUNT: CPT

## 2022-03-30 PROCEDURE — 700105 HCHG RX REV CODE 258: Performed by: EMERGENCY MEDICINE

## 2022-03-30 PROCEDURE — 76705 ECHO EXAM OF ABDOMEN: CPT

## 2022-03-30 PROCEDURE — RXMED WILLOW AMBULATORY MEDICATION CHARGE: Performed by: EMERGENCY MEDICINE

## 2022-03-30 PROCEDURE — 700111 HCHG RX REV CODE 636 W/ 250 OVERRIDE (IP): Performed by: EMERGENCY MEDICINE

## 2022-03-30 PROCEDURE — 96375 TX/PRO/DX INJ NEW DRUG ADDON: CPT

## 2022-03-30 PROCEDURE — 99285 EMERGENCY DEPT VISIT HI MDM: CPT

## 2022-03-30 PROCEDURE — 85027 COMPLETE CBC AUTOMATED: CPT

## 2022-03-30 PROCEDURE — 80053 COMPREHEN METABOLIC PANEL: CPT

## 2022-03-30 RX ORDER — SODIUM CHLORIDE 9 MG/ML
1000 INJECTION, SOLUTION INTRAVENOUS ONCE
Status: COMPLETED | OUTPATIENT
Start: 2022-03-30 | End: 2022-03-30

## 2022-03-30 RX ORDER — ONDANSETRON 2 MG/ML
4 INJECTION INTRAMUSCULAR; INTRAVENOUS ONCE
Status: COMPLETED | OUTPATIENT
Start: 2022-03-30 | End: 2022-03-30

## 2022-03-30 RX ORDER — ONDANSETRON 4 MG/1
4 TABLET, FILM COATED ORAL EVERY 4 HOURS PRN
Qty: 20 TABLET | Refills: 0 | Status: SHIPPED | OUTPATIENT
Start: 2022-03-30 | End: 2022-05-29

## 2022-03-30 RX ORDER — SUCRALFATE 1 G/1
1 TABLET ORAL
Qty: 120 TABLET | Refills: 3 | Status: SHIPPED | OUTPATIENT
Start: 2022-03-30 | End: 2022-04-30

## 2022-03-30 RX ORDER — PANTOPRAZOLE SODIUM 20 MG/1
20 TABLET, DELAYED RELEASE ORAL DAILY
Qty: 30 TABLET | Refills: 0 | Status: SHIPPED | OUTPATIENT
Start: 2022-03-30 | End: 2022-04-30

## 2022-03-30 RX ORDER — MORPHINE SULFATE 4 MG/ML
4 INJECTION INTRAVENOUS ONCE
Status: COMPLETED | OUTPATIENT
Start: 2022-03-30 | End: 2022-03-30

## 2022-03-30 RX ADMIN — SODIUM CHLORIDE 1000 ML: 9 INJECTION, SOLUTION INTRAVENOUS at 12:28

## 2022-03-30 RX ADMIN — ONDANSETRON 4 MG: 2 INJECTION INTRAMUSCULAR; INTRAVENOUS at 12:29

## 2022-03-30 RX ADMIN — MORPHINE SULFATE 4 MG: 4 INJECTION INTRAVENOUS at 12:29

## 2022-03-30 ASSESSMENT — FIBROSIS 4 INDEX: FIB4 SCORE: .55

## 2022-03-30 NOTE — ED PROVIDER NOTES
ED Provider Note    CHIEF COMPLAINT  Chief Complaint   Patient presents with   • Abdominal Pain   • Diarrhea   • Vomiting       HPI  Joseline Henderson is a 22 y.o. female who presents abdominal pain, diarrhea, vomiting Patient reports that she developed epigastric pain nausea and vomiting this morning.  Patient reports that she has had multiple episodes of nonbloody nonbilious emesis.  Patient denies any associated black or bloody stool.  Patient reports pain is pronounced in her epigastrium.  She denies any heavy alcohol abuse or history of gallstones.  Patient denies any dysuria urgency or frequency.  She reports she just had her period.  Patient denies any vaginal discharge or vaginal bleeding outside of her cycle.  Patient does report that she smokes marijuana regularly for history of anxiety.  Patient denies any prior surgical history.    Patient does not have symptoms of any chest pain or shortness of breath, she denies any association of her pain with exertion,       REVIEW OF SYSTEMS  ROS    See HPI for further details. All other systems are negative.     PAST MEDICAL HISTORY   has a past medical history of Anxiety disorder, Bipolar affective (HCC), Depression, and Psychiatric problem.    SOCIAL HISTORY  Social History     Tobacco Use   • Smoking status: Never Smoker   • Smokeless tobacco: Never Used   Vaping Use   • Vaping Use: Not on file   Substance and Sexual Activity   • Alcohol use: Yes     Comment: 2 drinks biweekly   • Drug use: Yes     Types: Inhaled     Comment: marijuana    • Sexual activity: Not on file       SURGICAL HISTORY  patient denies any surgical history    CURRENT MEDICATIONS  Home Medications     Reviewed by Nellie Weber R.N. (Registered Nurse) on 03/30/22 at 1116  Med List Status: Partial   Medication Last Dose Status   acetaminophen (TYLENOL) 325 MG Tab  Active   docusate sodium 100 MG Cap  Active   ibuprofen (MOTRIN) 600 MG Tab  Active   Prenatal MV-Min-Fe Fum-FA-DHA  (PRENATAL 1 PO)  Active                ALLERGIES  No Known Allergies    PHYSICAL EXAM  Vitals:    03/30/22 1432   BP:    Pulse: 95   Resp: 16   Temp:    SpO2: 96%       Physical Exam  Constitutional:       Appearance: She is well-developed.   HENT:      Head: Normocephalic and atraumatic.   Eyes:      Conjunctiva/sclera: Conjunctivae normal.   Cardiovascular:      Rate and Rhythm: Normal rate and regular rhythm.   Pulmonary:      Effort: Pulmonary effort is normal.      Breath sounds: Normal breath sounds.   Abdominal:      General: Bowel sounds are normal. There is no distension.      Palpations: Abdomen is soft.      Tenderness: There is abdominal tenderness in the epigastric area. There is no rebound.      Comments: No specific right lower or left lower quadrant tenderness palpation rebound or guarding   Musculoskeletal:      Cervical back: Normal range of motion and neck supple.   Skin:     General: Skin is warm and dry.      Findings: No rash.   Neurological:      Mental Status: She is alert and oriented to person, place, and time.   Psychiatric:         Behavior: Behavior normal.           DIAGNOSTIC STUDIES / PROCEDURES        LABS  Results for orders placed or performed during the hospital encounter of 03/30/22   CBC WITH DIFFERENTIAL   Result Value Ref Range    WBC 9.7 4.8 - 10.8 K/uL    RBC 5.02 4.20 - 5.40 M/uL    Hemoglobin 14.6 12.0 - 16.0 g/dL    Hematocrit 42.4 37.0 - 47.0 %    MCV 84.5 81.4 - 97.8 fL    MCH 29.1 27.0 - 33.0 pg    MCHC 34.4 33.6 - 35.0 g/dL    RDW 43.1 35.9 - 50.0 fL    Platelet Count 327 164 - 446 K/uL    MPV 10.4 9.0 - 12.9 fL    Neutrophils-Polys 79.80 (H) 44.00 - 72.00 %    Lymphocytes 18.40 (L) 22.00 - 41.00 %    Monocytes 1.80 0.00 - 13.40 %    Eosinophils 0.00 0.00 - 6.90 %    Basophils 0.00 0.00 - 1.80 %    Nucleated RBC 0.00 /100 WBC    Neutrophils (Absolute) 7.74 (H) 2.00 - 7.15 K/uL    Lymphs (Absolute) 1.78 1.00 - 4.80 K/uL    Monos (Absolute) 0.17 0.00 - 0.85 K/uL    Eos  (Absolute) 0.00 0.00 - 0.51 K/uL    Baso (Absolute) 0.00 0.00 - 0.12 K/uL    NRBC (Absolute) 0.00 K/uL    Anisocytosis 1+     Macrocytosis 1+    COMP METABOLIC PANEL   Result Value Ref Range    Sodium 139 135 - 145 mmol/L    Potassium 3.7 3.6 - 5.5 mmol/L    Chloride 105 96 - 112 mmol/L    Co2 21 20 - 33 mmol/L    Anion Gap 13.0 7.0 - 16.0    Glucose 107 (H) 65 - 99 mg/dL    Bun 13 8 - 22 mg/dL    Creatinine 0.55 0.50 - 1.40 mg/dL    Calcium 9.4 8.5 - 10.5 mg/dL    AST(SGOT) 38 12 - 45 U/L    ALT(SGPT) 37 2 - 50 U/L    Alkaline Phosphatase 76 30 - 99 U/L    Total Bilirubin 0.4 0.1 - 1.5 mg/dL    Albumin 4.4 3.2 - 4.9 g/dL    Total Protein 7.2 6.0 - 8.2 g/dL    Globulin 2.8 1.9 - 3.5 g/dL    A-G Ratio 1.6 g/dL   LIPASE   Result Value Ref Range    Lipase 22 11 - 82 U/L   URINALYSIS    Specimen: Urine   Result Value Ref Range    Color Yellow     Character Clear     Specific Gravity 1.025 <1.035    Ph 6.0 5.0 - 8.0    Glucose Negative Negative mg/dL    Ketones 80 (A) Negative mg/dL    Protein Negative Negative mg/dL    Bilirubin Negative Negative    Urobilinogen, Urine 1.0 Negative    Nitrite Negative Negative    Leukocyte Esterase Negative Negative    Occult Blood Trace (A) Negative    Micro Urine Req Microscopic    ESTIMATED GFR   Result Value Ref Range    GFR (CKD-EPI) 133 >60 mL/min/1.73 m 2   BETA-HCG QUALITATIVE SERUM   Result Value Ref Range    Beta-Hcg Qualitative Serum Negative Negative   MORPHOLOGY   Result Value Ref Range    RBC Morphology Present    PERIPHERAL SMEAR REVIEW   Result Value Ref Range    Peripheral Smear Review see below    DIFFERENTIAL MANUAL   Result Value Ref Range    Manual Diff Status PERFORMED    PLATELET ESTIMATE   Result Value Ref Range    Plt Estimation Normal    URINE MICROSCOPIC (W/UA)   Result Value Ref Range    WBC 0-2 /hpf    RBC 0-2 /hpf         RADIOLOGY  US-RUQ   Final Result      Negative right upper quadrant ultrasound.              COURSE & MEDICAL DECISION MAKING  Pertinent  Labs & Imaging studies reviewed. (See chart for details)    patient given IV fluids for evidence of dehydration, morphine and Zofran.  Following this patient feels considerably improved.  I did check basic labs for further risk stratification as well as an ultrasound.  Patient's differential includes cholecystitis, pancreatitis, gastritis.I believe cardiopulmonary etiology is highly unlikely patient's basic labs as well as her lipase are normal.  Patient remains without any right lower quadrant considerable tenderness or rebound or guarding on my exam.  I believe an atypical presentation of appendicitis or diverticulitis or surgical pathology is highly unlikely.  I discussed checking a CT with patient and did offer this, patient however would like to defer at this point and lieu of strict return precautions, I believe is a totally reasonable approach at this point.  Patient with possible cannabinoid hyperemesis syndrome.  I discussed this with patient.  Patient sent home with supportive care, pantoprazole, Carafate and Zofran.  Return precautions discussed     The patient will return for worsening symptoms and is stable at the time of discharge. The patient verbalizes understanding and will comply.    FINAL IMPRESSION     1. Acute gastritis without hemorrhage, unspecified gastritis type    2. Epigastric abdominal pain               Electronically signed by: Abdias Adrian M.D., 3/30/2022 3:18 PM

## 2022-03-30 NOTE — ED TRIAGE NOTES
Ambulatory to triage with   Chief Complaint   Patient presents with   • Abdominal Pain   • Diarrhea   • Vomiting   Above symptoms since last night. Denies fever. [facility] 3/23. C/o 5/10 sharp pain. Has taken tums and tylenol with no relief. . Protocol ordered.

## 2022-03-30 NOTE — ED NOTES
Discharge orders received, IV and monitor discontinued, instructions and education given, follow-up discussed, prescriptions sent to pharmacy, work note given, pt verbalized understanding.

## 2022-04-29 ENCOUNTER — HOSPITAL ENCOUNTER (EMERGENCY)
Facility: MEDICAL CENTER | Age: 22
End: 2022-04-29
Attending: EMERGENCY MEDICINE
Payer: MEDICAID

## 2022-04-29 ENCOUNTER — PHARMACY VISIT (OUTPATIENT)
Dept: PHARMACY | Facility: MEDICAL CENTER | Age: 22
End: 2022-04-29
Payer: COMMERCIAL

## 2022-04-29 VITALS
HEIGHT: 63 IN | RESPIRATION RATE: 16 BRPM | OXYGEN SATURATION: 98 % | BODY MASS INDEX: 22.54 KG/M2 | HEART RATE: 87 BPM | DIASTOLIC BLOOD PRESSURE: 81 MMHG | WEIGHT: 127.21 LBS | TEMPERATURE: 98.1 F | SYSTOLIC BLOOD PRESSURE: 117 MMHG

## 2022-04-29 DIAGNOSIS — F41.0 PANIC ATTACK: ICD-10-CM

## 2022-04-29 LAB
EKG IMPRESSION: NORMAL
POC BREATHALIZER: 0 PERCENT (ref 0–0.01)
TSH SERPL DL<=0.005 MIU/L-ACNC: 0.75 UIU/ML (ref 0.38–5.33)

## 2022-04-29 PROCEDURE — RXMED WILLOW AMBULATORY MEDICATION CHARGE: Performed by: EMERGENCY MEDICINE

## 2022-04-29 PROCEDURE — 302970 POC BREATHALIZER: Performed by: EMERGENCY MEDICINE

## 2022-04-29 PROCEDURE — 700102 HCHG RX REV CODE 250 W/ 637 OVERRIDE(OP): Performed by: EMERGENCY MEDICINE

## 2022-04-29 PROCEDURE — 99284 EMERGENCY DEPT VISIT MOD MDM: CPT

## 2022-04-29 PROCEDURE — 84443 ASSAY THYROID STIM HORMONE: CPT

## 2022-04-29 PROCEDURE — 36415 COLL VENOUS BLD VENIPUNCTURE: CPT

## 2022-04-29 PROCEDURE — 90791 PSYCH DIAGNOSTIC EVALUATION: CPT

## 2022-04-29 PROCEDURE — 93005 ELECTROCARDIOGRAM TRACING: CPT | Performed by: EMERGENCY MEDICINE

## 2022-04-29 PROCEDURE — A9270 NON-COVERED ITEM OR SERVICE: HCPCS | Performed by: EMERGENCY MEDICINE

## 2022-04-29 RX ORDER — HYDROXYZINE PAMOATE 25 MG/1
25 CAPSULE ORAL 3 TIMES DAILY PRN
Qty: 90 CAPSULE | Refills: 0 | Status: SHIPPED | OUTPATIENT
Start: 2022-04-29 | End: 2022-05-29

## 2022-04-29 RX ORDER — LORAZEPAM 1 MG/1
1 TABLET ORAL ONCE
Status: COMPLETED | OUTPATIENT
Start: 2022-04-29 | End: 2022-04-29

## 2022-04-29 RX ADMIN — LORAZEPAM 1 MG: 1 TABLET ORAL at 08:08

## 2022-04-29 ASSESSMENT — FIBROSIS 4 INDEX: FIB4 SCORE: 0.42

## 2022-04-29 NOTE — ED NOTES
"Pt awake, sitting upright in bed, speaking with rapid respirations and tearful. PT states hx of anxiety and states that today she has been having significant panic attack since middle of night. With ERP Dr. Tijerina at bedside pt reports that she has had thoughts of self harm \"with increasing frequent panic attacks\". Pt states she does not have a plan as to how she would harm herself but does have previous attempts by cutting her arms. Pt denies auditory or visual hallucinations at this time.   "

## 2022-04-29 NOTE — ED TRIAGE NOTES
"Chief Complaint   Patient presents with   • Panic Attack     Starting last night, hx of anxiety, pt anxious in triage with rapid respirations, difficult to console, VSS on RA     Pt ambulatory to triage with parent, VSS on RA, GCS 15. Pt states she took xanax this morning with no relief. Hx anxiety and bipolar per chart.    Denies all s/sx of covid, denies recent travel, denies fevers.    Pt returned to lobby. Educated on triage process and to inform staff of any changes.     BP (!) 173/85   Pulse (!) 116   Temp 37.3 °C (99.2 °F) (Temporal)   Resp (!) 28   Ht 1.6 m (5' 3\")   Wt 57.7 kg (127 lb 3.3 oz)   SpO2 98%   BMI 22.53 kg/m²     "

## 2022-04-29 NOTE — ED NOTES
Pt educated on behavorial process at this time. Pt clothing removed and placed into belongings bag, phone given to her grandfather at bedside. Pt in hospital gown. Pt moved to ED green 36 for continued behavorial/ medical care. Report given to Jovanna BELL

## 2022-04-29 NOTE — ED PROVIDER NOTES
ED Provider Note    CHIEF COMPLAINT  Chief Complaint   Patient presents with   • Panic Attack     Starting last night, hx of anxiety, pt anxious in triage with rapid respirations, difficult to console, VSS on RA       HPI  Joseline Henderson is a 22 y.o. female who presents with a panic attack.  It started last night.  Difficulty sleeping.  Took a Xanax.  Also had a few shots of whiskey to help her sleep.  She was able to go to sleep periodically but kept waking up panicky anxious.  Feeling her heart racing and pounding.  Shaking nervous all over.  She has a long history of anxiety.  She has been in therapy since she was a child.  She reports taking her medications as directed.  She denies using any drugs.  She has tried to kill herself in the past by cutting.  She has had suicidal thoughts that are worsening now that her anxiety is out of control.  She denies any homicidal thoughts.  No hallucinations.    REVIEW OF SYSTEMS  As per HPI, otherwise a 10 point review of systems is negative    PAST MEDICAL HISTORY  Past Medical History:   Diagnosis Date   • Anxiety disorder    • Bipolar affective (HCC)    • Depression    • Psychiatric problem        SOCIAL HISTORY  Social History     Tobacco Use   • Smoking status: Never Smoker   • Smokeless tobacco: Never Used   Substance Use Topics   • Alcohol use: Yes     Comment: 2 drinks biweekly   • Drug use: Yes     Types: Inhaled     Comment: marijuana        SURGICAL HISTORY  History reviewed. No pertinent surgical history.    CURRENT MEDICATIONS  Home Medications     Reviewed by Jeremy Gonzales R.N. (Registered Nurse) on 04/29/22 at 0713  Med List Status: <None>   Medication Last Dose Status   acetaminophen (TYLENOL) 325 MG Tab  Active   docusate sodium 100 MG Cap  Active   ibuprofen (MOTRIN) 600 MG Tab  Active   ondansetron (ZOFRAN) 4 MG Tab tablet  Active   pantoprazole (PROTONIX) 20 MG tablet  Active   Prenatal MV-Min-Fe Fum-FA-DHA (PRENATAL 1 PO)  Active   sucralfate  "(CARAFATE) 1 GM Tab  Active                ALLERGIES  No Known Allergies    PHYSICAL EXAM  VITAL SIGNS: /81   Pulse 88   Temp 37.3 °C (99.2 °F) (Temporal)   Resp (!) 22   Ht 1.6 m (5' 3\")   Wt 57.7 kg (127 lb 3.3 oz)   SpO2 96%   BMI 22.53 kg/m²    Constitutional: Awake and alert.  Extremely anxious, tearful, hyperventilating  HENT: Normal inspection  Eyes: Normal inspection  Neck: Grossly normal range of motion.  Cardiovascular: Normal heart rate, Normal rhythm.  Symmetric peripheral pulses.   Thorax & Lungs: Tachypnea, lung fields are clear  Abdomen: Bowel sounds normal, soft, non-distended, nontender, no mass  Skin: No obvious rash.  Back: No tenderness, No CVA tenderness.   Extremities: No clubbing, cyanosis, edema, no Homans or cords.  Neurologic: Grossly normal   Psychiatric: Extremely anxious        Labs:  Results for orders placed or performed during the hospital encounter of 22   TSH   Result Value Ref Range    TSH 0.750 0.380 - 5.330 uIU/mL   POC Breathalizer   Result Value Ref Range    POC Breathalizer 0.00 0.00 - 0.01 Percent   EKG (NOW)   Result Value Ref Range    Report       [facility] Emergency Dept.    Test Date:  2022  Pt Name:    CAILIN REMY               Department: ER  MRN:        [id number]                      Room:       [facility]  Gender:     Female                       Technician: [id number]  :        2000                   Requested By:SUSAN SÁNCHEZ  Order #:    [id number]                    Reading MD: SUSAN SÁNCHEZ MD    Measurements  Intervals                                Axis  Rate:       73                           P:          58  LA:         164                          QRS:        44  QRSD:       76                           T:          39  QT:         380  QTc:        419    Interpretive Statements  SINUS RHYTHM  Compared to ECG 2020 18:37:41  ST (T wave) deviation no longer present  Electronically Signed On 2022 " 8:52:56 PDT by SUSAN SÁNCHEZ MD         Medications   LORazepam (ATIVAN) tablet 1 mg (1 mg Oral Given 4/29/22 0808)       COURSE & MEDICAL DECISION MAKING  Patient presents having an active panic attack.  Her vital signs demonstrated tachycardia initially, but she settled down during interview.  She was given 1 Ativan tablet.  She made some statements about suicide although was very vague and did not think she would harm herself.  I obtained diagnostic testing which was unremarkable.  Consulted behavioral health.    Patient was seen by behavioral health.  The patient was feeling much better after lorazepam.  She is not suicidal.  She does not think she would hurt her self.  She had fleeting soft thoughts while having panic.  She is felt to be appropriate for outpatient management.  She is given resources for outpatient psychiatric care.  I will give her a prescription for hydroxyzine to take should she have another panic attack.  I have advised return to the ER for suicidal thoughts, uncontrolled symptoms or concern.  Referred to [facility] for primary medical care.    FINAL IMPRESSION  1.  Panic attack  2.  Palpitations      This dictation was created using voice recognition software. The accuracy of the dictation is limited to the abilities of the software.  The nursing notes were reviewed and certain aspects of this information were incorporated into this note.      Electronically signed by: Susan Snáchez M.D., 4/29/2022 8:51 AM

## 2022-04-29 NOTE — ED NOTES
Received report from Macario BELL. Assumed pt care. Pt wheeled over from yellow pod. Pt belongings placed into bag

## 2022-04-29 NOTE — CONSULTS
[facility]   TRIAGE ASSESSMENT    Name: Joseline Henderson  MRN: [id number]  : 2000  Age: 22 y.o.  Date of assessment: 2022  PCP: Pcp Pt States None  Persons in attendance: Patient  Patient Location: [facility]    CHIEF COMPLAINT/PRESENTING ISSUE (as stated by Pt states that she has had anxiety in the past and had a panic attack for the first time last night. During this panic attack she dug her fingernails in her forearms and had thoughts of suicide. She took one of her mother's Xanax which did not provide relief, which is why she went to the ED. She denies SI at this time and is willing to contract for safety. She lives with her  and two-year-old son who are supportive. She states her family of origin is not supportive, however. She has a history of multiple admissions to [facility] as an adolescent and she was in residential treatment at [facility] at age 16. She has a history of taking Wellbutrin and lithium in the past but has not taken psychiatric medications since 2018. Her brother  by suicide when he was 24 years old. They were close. Pt wishes to seek outpatient services but states she has had difficulty scheduling appointments and having insurance. Referrals to [facility], [facility] for IOP. Counseling resources/psychiatry list provided for outpatient services.   Chief Complaint   Patient presents with   • Panic Attack     Starting last night, hx of anxiety, pt anxious in triage with rapid respirations, difficult to console, VSS on RA        CURRENT LIVING SITUATION/SOCIAL SUPPORT/FINANCIAL RESOURCES: Lives with  and two-year-old son.    BEHAVIORAL HEALTH/SUBSTANCE USE TREATMENT HISTORY  Does patient/parent report a history of prior behavioral health/substance use treatment for patient?   Yes:    Dates Level of Care Facilty/Provider Diagnosis/Problem Medications   2018, multiple dates inpatient [facility] Bipolar,  borderline personality disorder, depression, SI Wellbutrin, Lithium   2018 inpatient [facility]  Bipolar, borderline personality disorder, depression, SI    Wellbutrin, Lithium                                                                 SAFETY ASSESSMENT - SELF  Does patient acknowledge current or past symptoms of dangerousness to self or is previous history noted? yes  Does parent/significant other report patient has current or past symptoms of dangerousness to self? N\A  Does presenting problem suggest symptoms of dangerousness to self? No    SAFETY ASSESSMENT - OTHERS  Does patient acknowledge current or past symptoms of aggressive behavior or risk to others or is previous history noted? yes  Does parent/significant other report patient has current or past symptoms of aggressive behavior or risk to others?  yes  Does presenting problem suggest symptoms of dangerousness to others? No    LEGAL HISTORY  Does patient acknowledge history of arrest/care home/correction or is previous history noted? no    Crisis Safety Plan completed and copy given to patient? yes    ABUSE/NEGLECT SCREENING  Does patient report feeling “unsafe” in his/her home, or afraid of anyone?  no  Does patient report any history of physical, sexual, or emotional abuse?  no  Does parent or significant other report any of the above? no  Is there evidence of neglect by self?  no  Is there evidence of neglect by a caregiver? no  Does the patient/parent report any history of CPS/APS/police involvement related to suspected abuse/neglect or domestic violence? no  Based on the information provided during the current assessment, is a mandated report of suspected abuse/neglect being made?  No    SUBSTANCE USE SCREENING  Yes:  Jj all substances used in the past 30 days:      Last Use Amount   [x]   Alcohol 4/28/22 2 shots   [x]   Marijuana 4/27/22 1/2 bowl   []   Heroin     []   Prescription Opioids  (used without prescription, for     "recreation, or in excess of prescribed amount)     []   Other Prescription  (used without prescription, for    recreation, or in excess of prescribed amount)     []   Cocaine      []   Methamphetamine     []   \"\" drugs (ectasy, MDMA)     []   Other substances        UDS results: pending  Breathalyzer results: pending    What consequences does the patient associate with any of the above substance use and or addictive behaviors? None    Risk factors for detox (check all that apply):  []  Seizures   []  Diaphoretic (sweating)   []  Tremors   []  Hallucinations   []  Increased blood pressure   []  Decreased blood pressure   [x]  Other   []  None      [] Patient education on risk factors for detoxification and instructed to return to ER as needed.      MENTAL STATUS   Participation: Active verbal participation  Grooming: Neat  Orientation: Alert  Behavior: Calm  Eye contact: Good  Mood: Anxious  Affect: Sad  Thought process: Logical and Goal-directed  Thought content: Within normal limits  Speech: Rate within normal limits  Perception: Within normal limits  Memory:  No gross evidence of memory deficits  Insight: Adequate  Judgment:  Adequate  Other:    Collateral information: N/A  Source:  [] Significant other present in person:   [] Significant other by telephone  [] [facility]   [] [facility] Nursing Staff  [] [facility]  [] Other:     [] Unable to complete full assessment due to:  [] Acute intoxication  [] Patient declined to participate/engage  [] Patient verbally unresponsive  [] Significant cognitive deficits  [] Significant perceptual distortions or behavioral disorganization  [] Other:      CLINICAL IMPRESSIONS:  Primary:  anxiety  Secondary:  Depressed mood       IDENTIFIED NEEDS/PLAN:  [Trigger DISPOSITION list for any items marked]    []  Imminent safety risk - self [] Imminent safety risk - others   []  Acute substance withdrawal []  Psychosis/Impaired reality testing   [x]  " Mood/anxiety []  Substance use/Addictive behavior   []  Maladaptive behaviro []  Parent/child conflict   []  Family/Couples conflict []  Biomedical   []  Housing []  Financial   []   Legal  Occupational/Educational   []  Domestic violence []  Other:     Recommended Plan of Care:  Actively being addressed by [facility] , [facility]  *Telesitter may not be utilized for moderate or high risk patients    Has the Recommended Plan of Care/Level of Observation been reviewed with the patient's assigned nurse? yes    Referral appointment(s) scheduled? no    Alert team only:   I have discussed findings and recommendations with Dr. Tijerina who is in agreement with these recommendations.       Tea Herrera R.N.  4/29/2022

## 2022-04-30 ENCOUNTER — HOSPITAL ENCOUNTER (EMERGENCY)
Facility: MEDICAL CENTER | Age: 22
End: 2022-05-02
Attending: EMERGENCY MEDICINE
Payer: MEDICAID

## 2022-04-30 DIAGNOSIS — F41.0 PANIC ATTACK: ICD-10-CM

## 2022-04-30 DIAGNOSIS — F32.A DEPRESSION, UNSPECIFIED DEPRESSION TYPE: ICD-10-CM

## 2022-04-30 DIAGNOSIS — R45.851 SUICIDAL IDEATION: ICD-10-CM

## 2022-04-30 LAB
AMPHET UR QL SCN: NEGATIVE
BARBITURATES UR QL SCN: NEGATIVE
BENZODIAZ UR QL SCN: POSITIVE
BZE UR QL SCN: NEGATIVE
CANNABINOIDS UR QL SCN: POSITIVE
HCG UR QL: NEGATIVE
METHADONE UR QL SCN: NEGATIVE
OPIATES UR QL SCN: NEGATIVE
OXYCODONE UR QL SCN: NEGATIVE
PCP UR QL SCN: NEGATIVE
POC BREATHALIZER: 0 PERCENT (ref 0–0.01)
POC BREATHALIZER: 0 PERCENT (ref 0–0.01)
PROPOXYPH UR QL SCN: NEGATIVE

## 2022-04-30 PROCEDURE — 700111 HCHG RX REV CODE 636 W/ 250 OVERRIDE (IP): Performed by: EMERGENCY MEDICINE

## 2022-04-30 PROCEDURE — 96372 THER/PROPH/DIAG INJ SC/IM: CPT

## 2022-04-30 PROCEDURE — 99285 EMERGENCY DEPT VISIT HI MDM: CPT

## 2022-04-30 PROCEDURE — 302970 POC BREATHALIZER: Performed by: EMERGENCY MEDICINE

## 2022-04-30 PROCEDURE — 81025 URINE PREGNANCY TEST: CPT

## 2022-04-30 PROCEDURE — 90791 PSYCH DIAGNOSTIC EVALUATION: CPT

## 2022-04-30 PROCEDURE — A9270 NON-COVERED ITEM OR SERVICE: HCPCS | Performed by: EMERGENCY MEDICINE

## 2022-04-30 PROCEDURE — 80307 DRUG TEST PRSMV CHEM ANLYZR: CPT

## 2022-04-30 PROCEDURE — 700102 HCHG RX REV CODE 250 W/ 637 OVERRIDE(OP): Performed by: EMERGENCY MEDICINE

## 2022-04-30 RX ORDER — LORAZEPAM 2 MG/ML
2 INJECTION INTRAMUSCULAR ONCE
Status: COMPLETED | OUTPATIENT
Start: 2022-04-30 | End: 2022-04-30

## 2022-04-30 RX ORDER — DIPHENHYDRAMINE HYDROCHLORIDE 50 MG/ML
50 INJECTION INTRAMUSCULAR; INTRAVENOUS ONCE
Status: COMPLETED | OUTPATIENT
Start: 2022-04-30 | End: 2022-04-30

## 2022-04-30 RX ORDER — HYDROXYZINE HYDROCHLORIDE 25 MG/1
50 TABLET, FILM COATED ORAL ONCE
Status: COMPLETED | OUTPATIENT
Start: 2022-04-30 | End: 2022-04-30

## 2022-04-30 RX ADMIN — DIPHENHYDRAMINE HYDROCHLORIDE 50 MG: 50 INJECTION INTRAMUSCULAR; INTRAVENOUS at 12:39

## 2022-04-30 RX ADMIN — HYDROXYZINE HYDROCHLORIDE 50 MG: 25 TABLET, FILM COATED ORAL at 20:45

## 2022-04-30 RX ADMIN — LORAZEPAM 2 MG: 2 INJECTION INTRAMUSCULAR; INTRAVENOUS at 12:38

## 2022-04-30 ASSESSMENT — FIBROSIS 4 INDEX: FIB4 SCORE: 0.42

## 2022-04-30 NOTE — ED PROVIDER NOTES
"ED Provider Note    Scribed for Gen Hart M.D. by Vero Young. 4/30/2022  12:02 PM    Primary care provider: Pcp Pt States None  Means of arrival: Walk-In  History obtained from: Patient  History limited by: None    CHIEF COMPLAINT  Chief Complaint   Patient presents with   • Suicidal Ideation     PT reports being seen yesterday for panic attacks and DC'd home with medications but reports meds are not helping.  Pt tearful and hysterical in triage reports \"I am going to hurt myself if I can't calm down\".  Pt reports previous attempt on suicide 1 year ago.  Pt states she \"would probably cut herself if she were left alone\".  Pt tried to go to [facility] for check in this morning but the line was too long and she couldn't calm down.        HPI  Joseline Henderson is a 22 y.o. female who presents to the Emergency Department for suicidal ideation. Patient was seen here yesterday and diagnosed with panic attack disorder. She returns today as her symptoms didn't improve after getting discharged. She continues to say \"I just want it to stop\" and adds \"I've never felt this way before.\" She notes that drinking alcohol triggers her panic attacks and admits to using marijuana since leaving the hospital. She adds that she woke up today and \"felt high\" but denies using any stimulants. She does not report of fever.     REVIEW OF SYSTEMS  Pertinent positives include suicidal ideation. Pertinent negatives include fever.  All other systems reviewed and negative. See HPI for further details.       PAST MEDICAL HISTORY   has a past medical history of Anxiety disorder, Bipolar affective (HCC), Depression, and Psychiatric problem.    SURGICAL HISTORY  patient denies any surgical history    SOCIAL HISTORY  Social History     Tobacco Use   • Smoking status: Never Smoker   • Smokeless tobacco: Never Used   Substance Use Topics   • Alcohol use: Yes     Comment: 2 drinks biweekly   • Drug use: Yes     Types: Inhaled     Comment: " "marijuana       Social History     Substance and Sexual Activity   Drug Use Yes   • Types: Inhaled    Comment: marijuana        FAMILY HISTORY  Family history is reviewed.  There is no pertinent family history.      CURRENT MEDICATIONS  Home Medications     Reviewed by Aracely Beatty R.N. (Registered Nurse) on 04/30/22 at 1142  Med List Status: Partial   Medication Last Dose Status   acetaminophen (TYLENOL) 325 MG Tab  Active   docusate sodium 100 MG Cap  Active   hydrOXYzine pamoate (VISTARIL) 25 MG Cap  Active   ibuprofen (MOTRIN) 600 MG Tab  Active   ondansetron (ZOFRAN) 4 MG Tab tablet  Active   pantoprazole (PROTONIX) 20 MG tablet  Active   Prenatal MV-Min-Fe Fum-FA-DHA (PRENATAL 1 PO)  Active   sucralfate (CARAFATE) 1 GM Tab  Active                ALLERGIES  No Known Allergies    PHYSICAL EXAM  VITAL SIGNS: /96   Pulse (!) 107   Temp 36.4 °C (97.5 °F) (Temporal)   Resp (!) 23   Ht 1.6 m (5' 3\")   Wt 56.9 kg (125 lb 7.1 oz)   LMP 04/19/2022   SpO2 97%   BMI 22.22 kg/m²     Nursing note and vitals reviewed.  Constitutional: Well-developed and well-nourished. No distress.   HENT: Head is normocephalic and atraumatic. Oropharynx is clear and moist without exudate or erythema.   Eyes: Pupils are equal, round, and reactive to light. Conjunctiva are normal.   Cardiovascular: Normal rate and regular rhythm. No murmur heard. Normal radial pulses.  Pulmonary/Chest: Breath sounds normal. No wheezes or rales.   Abdominal: Soft and non-tender. No distention    Musculoskeletal: Extremities exhibit normal range of motion without edema or tenderness.   Neurological: Awake, alert and oriented to person, place, and time. No focal deficits noted.  Skin: Skin is warm and dry. No rash.   Psychiatric: Extremely anxious. Pacing around the room. Makes vague suicidal comments. Plan would be to cut herself if she were to be left alone.     DIAGNOSTIC STUDIES / PROCEDURES    LABS  Results for orders placed or performed " during the hospital encounter of 22   TSH   Result Value Ref Range    TSH 0.750 0.380 - 5.330 uIU/mL   POC Breathalizer   Result Value Ref Range    POC Breathalizer 0.00 0.00 - 0.01 Percent   EKG (NOW)   Result Value Ref Range    Report       [facility] Emergency Dept.    Test Date:  2022  Pt Name:    CAILIN REMY               Department: ER  MRN:        [id number]                      Room:       [facility]  Gender:     Female                       Technician: [id number]  :        2000                   Requested By:SUSAN SÁNCHEZ  Order #:    [id number]                    Reading MD: SUSAN SÁNCHEZ MD    Measurements  Intervals                                Axis  Rate:       73                           P:          58  MS:         164                          QRS:        44  QRSD:       76                           T:          39  QT:         380  QTc:        419    Interpretive Statements  SINUS RHYTHM  Compared to ECG 2020 18:37:41  ST (T wave) deviation no longer present  Electronically Signed On 2022 8:52:56 PDT by SUSAN SÁNCHEZ MD        All labs reviewed by me.    COURSE & MEDICAL DECISION MAKING  Nursing notes, VS, PMSFHx reviewed in chart.     Review of past medical records shows the patient was seen here yesterday and was diagnosed with panic attack disorder. She was given a prescription for Hydroxyzine and treated with Ativan in the ED. She has a history of anxiety, depression, and bipolar disorder.     12:02 PM - Patient seen and examined at bedside. Patient will be treated with Benadryl 50 mg and Ativan 2 mg.  Ordered Beta-HCG Qualitative Urine, POC Breathalizer, and Urine Drug Screen to evaluate her symptoms.     1:01 PM - Patient was reevaluated at bedside. She is asleep.     1:04 PM - Patient admitted to ED Observation at this time on 2022 for suicidal ideation. Dr. Sage (ERP) will assume care at this time.       FINAL IMPRESSION  1. Panic  attack    2. Suicidal ideation          I, Vero Young (Scribe), am scribing for, and in the presence of, Gen Hart M.D..    Electronically signed by: Vero Young ([place]), 4/30/2022    Gen HOPE M.D. personally performed the services described in this documentation, as scribed by Vero Young in my presence, and it is both accurate and complete.    The note accurately reflects work and decisions made by me.  Gen Hart M.D.  4/30/2022  1:15 PM

## 2022-04-30 NOTE — ED TRIAGE NOTES
"Chief Complaint   Patient presents with   • Suicidal Ideation     PT reports being seen yesterday for panic attacks and DC'd home with medications but reports meds are not helping.  Pt tearful and hysterical in triage reports \"I am going to hurt myself if I can't calm down\".  Pt reports previous attempt on suicide 1 year ago.  Pt states she \"would probably cut herself if she were left alone\".  Pt tried to go to [facility] for check in this morning but the line was too long and she couldn't calm down.        Pt to triage from [facility] with above complaint.    /96   Pulse (!) 107   Temp 36.4 °C (97.5 °F) (Temporal)   Resp (!) 23   Ht 1.6 m (5' 3\")   Wt 56.9 kg (125 lb 7.1 oz)   LMP 04/19/2022   SpO2 97%   BMI 22.22 kg/m²     "

## 2022-04-30 NOTE — ED NOTES
"Pt pacing in room, reports pacing helps he not become aggressive. States \"I want to throw things, pacing is helping me\".  Pt medicated per MAR.   "

## 2022-04-30 NOTE — ED NOTES
Patient resting in bed, appears to be sleeping with even rise and fall of chest noted. No obvious signs of pain or distress, sitter in hallway performing direct observation, repositions self occasionally.

## 2022-04-30 NOTE — ED NOTES
Patient in hospital gown, all personal belongings removed and inspected by security personell. All potentially harmful non medically essential items removed from room. Belongings placed in locker in ambulance foyer. Sitter in direct observation.

## 2022-05-01 LAB
SARS-COV+SARS-COV-2 AG RESP QL IA.RAPID: NOTDETECTED
SPECIMEN SOURCE: NORMAL

## 2022-05-01 PROCEDURE — 700102 HCHG RX REV CODE 250 W/ 637 OVERRIDE(OP): Performed by: EMERGENCY MEDICINE

## 2022-05-01 PROCEDURE — 87426 SARSCOV CORONAVIRUS AG IA: CPT

## 2022-05-01 PROCEDURE — A9270 NON-COVERED ITEM OR SERVICE: HCPCS | Performed by: EMERGENCY MEDICINE

## 2022-05-01 RX ORDER — HYDROXYZINE HYDROCHLORIDE 25 MG/1
25 TABLET, FILM COATED ORAL 3 TIMES DAILY PRN
Status: DISCONTINUED | OUTPATIENT
Start: 2022-05-01 | End: 2022-05-02 | Stop reason: HOSPADM

## 2022-05-01 RX ORDER — ACETAMINOPHEN 325 MG/1
650 TABLET ORAL ONCE
Status: COMPLETED | OUTPATIENT
Start: 2022-05-01 | End: 2022-05-01

## 2022-05-01 RX ORDER — LORAZEPAM 1 MG/1
1 TABLET ORAL ONCE
Status: COMPLETED | OUTPATIENT
Start: 2022-05-02 | End: 2022-05-01

## 2022-05-01 RX ADMIN — ACETAMINOPHEN 650 MG: 325 TABLET, FILM COATED ORAL at 18:46

## 2022-05-01 RX ADMIN — HYDROXYZINE HYDROCHLORIDE 25 MG: 25 TABLET, FILM COATED ORAL at 09:58

## 2022-05-01 RX ADMIN — HYDROXYZINE HYDROCHLORIDE 25 MG: 25 TABLET, FILM COATED ORAL at 16:16

## 2022-05-01 RX ADMIN — LORAZEPAM 1 MG: 1 TABLET ORAL at 23:35

## 2022-05-01 NOTE — ED NOTES
"Pt. States Atarax is helping \"some\" with anxiety. Is tearful and states \"I just really want to try to keep it under control.\" Folding origami shapes; given additional printed patterns. Denies further needs.   "

## 2022-05-01 NOTE — ED NOTES
Med rec completed per patient at bedside.  Allergies reviewed with patient. NKDA.  No outpatient antibiotics in the last 30 days.  Patient's preferred pharmacy: [facility] on [address].    Patient states not using pantoprazole or sucralfate; these medications have been removed from the med rec.

## 2022-05-01 NOTE — ED PROVIDER NOTES
"        Patient is persistently suicidal.  As she does not have a plan at this point however she cannot contract for safety.  She is still somewhat somnolent from calming medications provided earlier in the day.  With patient being persistently suicidal if she fails to contract for safety she is not currently safe for self.  No other acute medical situation at this time.  Patient will have legal hold certified and will be transferred to the next available psychiatric facility.  Discussed with behavioral health life skills.  We are both in agreement that should patient improved throughout her time here and be able to contact for safety of be reasonable to DC the hold for outpatient management however at this time that has not currently a safe plan for the patient.      6:13 PM    Patient will be admitted to ED observation status at this time for ongoing close evaluation pending further psychiatric care/placement.      /68   Pulse 70   Temp 36.4 °C (97.5 °F) (Temporal)   Resp 16   Ht 1.6 m (5' 3\")   Wt 56.9 kg (125 lb 7.1 oz)   LMP 04/19/2022   SpO2 98%   BMI 22.22 kg/m²     Impression:  1.  Situational depression  2.  Suicidal ideation  "

## 2022-05-01 NOTE — ED NOTES
Report received from prior RN. Pt resting, no signs of distress. Resp normal/unlabored. Bed side rails up/in low position.     Sitter updated with POC and informed of pt's status, sitter at bedside with complete observation of pt. No new events currently.

## 2022-05-01 NOTE — PROGRESS NOTES
"ED Provider Progress Note    ED Observation Progress Note    Date of Service: 05/01/22    Interval History  Patient reevaluated.  Patient is on a legal hold, waiting transfer to psychiatric facility when a bed is available.  Please refer to initial note for complete details.  No concerns overnight.  Patient ambulates to the bathroom independently and tolerated a meal.  Medication reconciliation has been reviewed.    Physical Exam  /75   Pulse 87   Temp 36.1 °C (97 °F) (Temporal)   Resp 16   Ht 1.6 m (5' 3\")   Wt 56.9 kg (125 lb 7.1 oz)   LMP 04/19/2022   SpO2 96%   BMI 22.22 kg/m² .    Constitutional: Awake and alert. Nontoxic  HENT:  Grossly normal  Eyes: Grossly normal  Neck: Normal range of motion  Cardiovascular: Normal peripheral perfusion.  Thorax & Lungs: Nonlabored respirations  Skin: Warm, dry  Extremities: Ambulates independently  Psychiatric: Flat affect.  Cooperative.    Labs  Results for orders placed or performed during the hospital encounter of 04/30/22   Urine Drug Screen   Result Value Ref Range    Amphetamines Urine Negative Negative    Barbiturates Negative Negative    Benzodiazepines Positive (A) Negative    Cocaine Metabolite Negative Negative    Methadone Negative Negative    Opiates Negative Negative    Oxycodone Negative Negative    Phencyclidine -Pcp Negative Negative    Propoxyphene Negative Negative    Cannabinoid Metab Positive (A) Negative   BETA-HCG QUALITATIVE URINE   Result Value Ref Range    Beta-Hcg Urine Negative Negative   POC BREATHALIZER   Result Value Ref Range    POC Breathalizer 0.00 0.00 - 0.01 Percent   POC BREATHALIZER   Result Value Ref Range    POC Breathalizer 0.00 0.00 - 0.01 Percent       Radiology  No orders to display       Problem List  1.  Suicidal ideation -awaiting psychiatric consultation.  Awaiting transfer to psychiatric facility when a bed is available.      Electronically signed by: Joseline Kwong D.O., 5/1/2022 9:20 AM    "

## 2022-05-01 NOTE — ED NOTES
Pt. Tearful and requesting medication for anxiety at this time. Reassurance provided and medications administered. Pt. Denies further needs at this time.

## 2022-05-01 NOTE — CONSULTS
"[facility]   TRIAGE ASSESSMENT    Name: Joseline Henderson  MRN: [id number]  : 2000  Age: 22 y.o.  Date of assessment: 2022  PCP: Pcp Pt States None  Persons in attendance: Patient  Patient Location: [facility]    CHIEF COMPLAINT/PRESENTING ISSUE (as stated by pt): \"I just want this feeling to stop.\" Endorses vague SI without plan, just states that she would do anything to make this feeling go away. Cannot contract for safety. Pt seen yesterday for anxiety without SI and was given referral resources in the community. She was instructed to follow up with [facility] walk-in clinic. Pt states she attempted to go to [facility] today but the lines were too long and she \"started freaking out.\" She then came to the ED for further evaluation. She was put in a room and began pacing rapidly. Pt was then given 2mg Ativan and 50mg Benadryl. She then slept until early evening, at which time she continued to endorse SI. Pt has a history of multiple inpatient hospitalizations as an adolescent at [facility] and was in residential treatment at [place] from ages [age].  Chief Complaint   Patient presents with   • Suicidal Ideation     PT reports being seen yesterday for panic attacks and DC'd home with medications but reports meds are not helping.  Pt tearful and hysterical in triage reports \"I am going to hurt myself if I can't calm down\".  Pt reports previous attempt on suicide 1 year ago.  Pt states she \"would probably cut herself if she were left alone\".  Pt tried to go to [facility] for check in this morning but the line was too long and she couldn't calm down.         CURRENT LIVING SITUATION/SOCIAL SUPPORT/FINANCIAL RESOURCES: Lives with  and son.    BEHAVIORAL HEALTH/SUBSTANCE USE TREATMENT HISTORY  Does patient/parent report a history of prior behavioral health/substance use treatment for patient?   Yes:    Dates Level of Care Facilty/Provider Diagnosis/Problem " Medications   multiple inpt [facility] Depression,anxiety, SI multiple   Age [age] residential [place] Depression, anxiety, SI multiple                                                                 SAFETY ASSESSMENT - SELF  Does patient acknowledge current or past symptoms of dangerousness to self or is previous history noted? yes  Does parent/significant other report patient has current or past symptoms of dangerousness to self? N\A  Does presenting problem suggest symptoms of dangerousness to self? Yes:     Past Current    Suicidal Thoughts: [x]  [x]    Suicidal Plans: [x]  []    Suicidal Intent: [x]  []    Suicide Attempts: [x]  []    Self-Injury [x]  []      For any boxes checked above, provide detail:      History of suicide by family member: yes - Brother  by suicide one year ago  History of suicide by friend/significant other: no  Recent change in frequency/specificity/intensity of suicidal thoughts or self-harm behavior? yes   Current access to firearms, medications, or other identified means of suicide/self-harm? no  If yes, willing to restrict access to means of suicide/self-harm? no  Protective factors present:  Reasons for living identified by patient: Family. Pt has a two-year-old son.    SAFETY ASSESSMENT - OTHERS  Does patient acknowledge current or past symptoms of aggressive behavior or risk to others or is previous history noted? yes  Does parent/significant other report patient has current or past symptoms of aggressive behavior or risk to others?  N\A  Does presenting problem suggest symptoms of dangerousness to others? No    LEGAL HISTORY  Does patient acknowledge history of arrest/residential/USP or is previous history noted? no    Crisis Safety Plan completed and copy given to patient? N\A    ABUSE/NEGLECT SCREENING  Does patient report feeling “unsafe” in his/her home, or afraid of anyone?  no  Does patient report any history of physical, sexual, or  "emotional abuse?  no  Does parent or significant other report any of the above? N\A  Is there evidence of neglect by self?  no  Is there evidence of neglect by a caregiver? no  Does the patient/parent report any history of CPS/APS/police involvement related to suspected abuse/neglect or domestic violence? no  Based on the information provided during the current assessment, is a mandated report of suspected abuse/neglect being made?  No    SUBSTANCE USE SCREENING  Yes:  Jj all substances used in the past 30 days:      Last Use Amount   [x]   Alcohol 4/27/22 2 shots   [x]   Marijuana 4/29/22 unsure   []   Heroin     []   Prescription Opioids  (used without prescription, for    recreation, or in excess of prescribed amount)     []   Other Prescription  (used without prescription, for    recreation, or in excess of prescribed amount)     []   Cocaine      []   Methamphetamine     []   \"\" drugs (ectasy, MDMA)     []   Other substances        UDS results: pending  Breathalyzer results:     What consequences does the patient associate with any of the above substance use and or addictive behaviors? None    Risk factors for detox (check all that apply):  []  Seizures   []  Diaphoretic (sweating)   []  Tremors   []  Hallucinations   []  Increased blood pressure   []  Decreased blood pressure   []  Other   [x]  None      [] Patient education on risk factors for detoxification and instructed to return to ER as needed.      MENTAL STATUS   Participation: Limited verbal participation  Grooming: Casual  Orientation: Alert and Drowsy/Somnolent  Behavior: Calm  Eye contact: Good  Mood: Anxious  Affect: Tearful  Thought process: Logical and Goal-directed  Thought content: Within normal limits  Speech: Soft  Perception: Within normal limits  Memory:  No gross evidence of memory deficits  Insight: Limited  Judgment:  Limited  Other:    Collateral information:   Source:  [] Significant other present in person:   [] Significant " other by telephone  [] [facility]   [x] [facility] Nursing Staff  [x] [facility]  [] Other:     [] Unable to complete full assessment due to:  [] Acute intoxication  [] Patient declined to participate/engage  [] Patient verbally unresponsive  [] Significant cognitive deficits  [] Significant perceptual distortions or behavioral disorganization  [] Other:      CLINICAL IMPRESSIONS:  Primary: panic attack  Secondary:  anxiety       IDENTIFIED NEEDS/PLAN:  [Trigger DISPOSITION list for any items marked]    [x]  Imminent safety risk - self [] Imminent safety risk - others   []  Acute substance withdrawal []  Psychosis/Impaired reality testing   [x]  Mood/anxiety []  Substance use/Addictive behavior   []  Maladaptive behaviro []  Parent/child conflict   []  Family/Couples conflict []  Biomedical   []  Housing []  Financial   []   Legal  Occupational/Educational   []  Domestic violence []  Other:     Recommended Plan of Care:  Actively being addressed by Legal Hold  1:1 sitter for safety  *Telesitter may not be utilized for moderate or high risk patients    Has the Recommended Plan of Care/Level of Observation been reviewed with the patient's assigned nurse? yes    Does patient/parent or guardian express agreement with the above plan? yes  If a pediatric/adolescent patient, have out of town/out of state inpatient MH tx options been reviewed with parent/legal guardian with verbal consent given for referrals to be sent? no  Referral appointment(s) scheduled? N\A    Alert team only:   I have discussed findings and recommendations with Dr. Sage who is in agreement with these recommendations.     Referral information sent to the following outpatient community providers :    Referral information sent to the following inpatient community providers :    If applicable : Referred  to  Alert Team for legal hold follow up at (time):       Tea Herrera R.N.  4/30/2022

## 2022-05-02 ENCOUNTER — PHARMACY VISIT (OUTPATIENT)
Dept: PHARMACY | Facility: MEDICAL CENTER | Age: 22
End: 2022-05-02
Payer: COMMERCIAL

## 2022-05-02 VITALS
OXYGEN SATURATION: 95 % | TEMPERATURE: 97.6 F | SYSTOLIC BLOOD PRESSURE: 110 MMHG | RESPIRATION RATE: 16 BRPM | HEART RATE: 80 BPM | WEIGHT: 125.44 LBS | DIASTOLIC BLOOD PRESSURE: 80 MMHG | BODY MASS INDEX: 22.23 KG/M2 | HEIGHT: 63 IN

## 2022-05-02 PROCEDURE — A9270 NON-COVERED ITEM OR SERVICE: HCPCS | Performed by: EMERGENCY MEDICINE

## 2022-05-02 PROCEDURE — 99284 EMERGENCY DEPT VISIT MOD MDM: CPT | Performed by: PSYCHIATRY & NEUROLOGY

## 2022-05-02 PROCEDURE — 700102 HCHG RX REV CODE 250 W/ 637 OVERRIDE(OP): Performed by: EMERGENCY MEDICINE

## 2022-05-02 PROCEDURE — RXMED WILLOW AMBULATORY MEDICATION CHARGE: Performed by: EMERGENCY MEDICINE

## 2022-05-02 RX ORDER — LORAZEPAM 1 MG/1
1 TABLET ORAL ONCE
Status: COMPLETED | OUTPATIENT
Start: 2022-05-02 | End: 2022-05-02

## 2022-05-02 RX ORDER — QUETIAPINE FUMARATE 25 MG/1
25 TABLET, FILM COATED ORAL
Qty: 30 TABLET | Refills: 0 | Status: SHIPPED | OUTPATIENT
Start: 2022-05-02 | End: 2022-05-29

## 2022-05-02 RX ORDER — QUETIAPINE FUMARATE 25 MG/1
25 TABLET, FILM COATED ORAL NIGHTLY
Status: DISCONTINUED | OUTPATIENT
Start: 2022-05-02 | End: 2022-05-02 | Stop reason: HOSPADM

## 2022-05-02 RX ORDER — QUETIAPINE FUMARATE 25 MG/1
25 TABLET, FILM COATED ORAL
Qty: 30 TABLET | Refills: 0 | Status: SHIPPED | OUTPATIENT
Start: 2022-05-02 | End: 2022-05-02 | Stop reason: SDUPTHER

## 2022-05-02 RX ADMIN — LORAZEPAM 1 MG: 1 TABLET ORAL at 11:48

## 2022-05-02 RX ADMIN — HYDROXYZINE HYDROCHLORIDE 25 MG: 25 TABLET, FILM COATED ORAL at 15:17

## 2022-05-02 RX ADMIN — HYDROXYZINE HYDROCHLORIDE 25 MG: 25 TABLET, FILM COATED ORAL at 07:32

## 2022-05-02 NOTE — DISCHARGE PLANNING
Alert Team:    Patient resting throughout the night without incident with 1:1 sitter observing patient safety outside of room; Oral Ativan provided for anxiety at 2335 for anxiety; no other interventions requested or required during PM shift. IP Psychiatry Consult ordered for further evaluation later today. Referral sent to [facility] and [facility]; awaiting acceptance. Alert team will continue to monitor.

## 2022-05-02 NOTE — CONSULTS
BRIEF NOTE:  Dx: Generalized anxiety disorder  MDD  Borderline personality disorder  History of unspecified bipolar disorder    Plan:  Legal hold discontinued   start Seroquel 25 mg p.o. nightly for mood stabilization. Please provide 15 days prescription upon discharge.   Patient was advised to get connected with outpatient psychiatric services.  She was given referrals to [facility], [facility] and [facility] clinic.   Safety plan completed   was contacted and he has no safety concerns at this time with her discharge  Psychiatry signing off

## 2022-05-02 NOTE — DISCHARGE PLANNING
"Pt reports anxiety r/t \"being in here.\" Discussed transfer to inpt. Pt became tearful and stated she wanted to go home.   "

## 2022-05-02 NOTE — ED NOTES
Patient discharged home. All pt belongings and home medication has been given back to pt. AVS reviewed and understood, all questions answered.

## 2022-05-02 NOTE — PROGRESS NOTES
"ED Provider Progress Note    ED Observation Progress Note    Date of Service: 05/02/22    Interval History  Patient reevaluated.  Patient is on a legal hold, waiting transfer to psychiatric facility when a bed is available.  Please refer to initial note for complete details.  No concerns overnight.  Patient ambulates to the bathroom independently and tolerated a meal.  Medication reconciliation has been reviewed.  As needed hydroxyzine with relief of anxiety.    Physical Exam  /69   Pulse 67   Temp 36.7 °C (98 °F) (Temporal)   Resp 16   Ht 1.6 m (5' 3\")   Wt 56.9 kg (125 lb 7.1 oz)   LMP 04/19/2022   SpO2 96%   BMI 22.22 kg/m² .    Constitutional: Awake and alert. Nontoxic  HENT:  Grossly normal  Eyes: Grossly normal  Neck: Normal range of motion  Cardiovascular: Normal peripheral perfusion  Thorax & Lungs: Unlabored respirations  Skin:  warm and dry  Extremities: No deformities noted  Psychiatric: Flat affect.    Labs  Results for orders placed or performed during the hospital encounter of 04/30/22   Urine Drug Screen   Result Value Ref Range    Amphetamines Urine Negative Negative    Barbiturates Negative Negative    Benzodiazepines Positive (A) Negative    Cocaine Metabolite Negative Negative    Methadone Negative Negative    Opiates Negative Negative    Oxycodone Negative Negative    Phencyclidine -Pcp Negative Negative    Propoxyphene Negative Negative    Cannabinoid Metab Positive (A) Negative   BETA-HCG QUALITATIVE URINE   Result Value Ref Range    Beta-Hcg Urine Negative Negative   SARS-COV Antigen FRANSISCA   Result Value Ref Range    SARS-CoV-2 Source Nasal Swab     SARS-COV ANTIGEN FRANSISCA NotDetected NotDetected   POC BREATHALIZER   Result Value Ref Range    POC Breathalizer 0.00 0.00 - 0.01 Percent   POC BREATHALIZER   Result Value Ref Range    POC Breathalizer 0.00 0.00 - 0.01 Percent       Radiology  No orders to display       Problem List  1.  Suicidal ideation, anxiety -compliant with " medications.  Awaiting psychiatry evaluation.  Awaiting transfer to psychiatric facility when a bed is available.      Electronically signed by: Joseline Kwong D.O., 5/2/2022 6:30 AM

## 2022-05-02 NOTE — DISCHARGE PLANNING
[facility] ED Behavioral Health Fax Referral      [facility] ED Behavioral Health Alert Team:  [phone]    Referral: Legal Hold    Intervention: Patient referral to [facility] inpatient  facillity    Legal Hold Initiated: Date: 4/30/22 Time: 1155    Patient’s Insurance Listed on Face Sheet: FFS    Referrals sent to: [facility], [facility]    Referrals faxed by RAY Hodge    This referral contains the following information:  1) Face sheet ___x_  2) Page 1 and Page 2 of Legal Hold __x__  3) Alert Team Assessment/Psych Assessment __x__  4) Head to toe physical exam __x__  5) Urine Drug Screen _x___  6) Blood Alcohol _x___  7) Vital signs __x__  8) Pregnancy test when applicable __x  9) Medications list _x___  10) Covid screening __pending__    Plan: Patient will transfer to mental health facility once acceptance is obtained

## 2022-05-02 NOTE — ED NOTES
Pt awake and alert. Respirations unlabored and even. Pt speech is clear. Pt cooperative at bedside. Pt currently denies any thought of SI/HI. Pt reports feeling like her anxiety and panic attack have gradually become worse, especially since her brother passed away in January. Pt states she came to hospital because in a moment of having a panic attack she felt she did not have control of herself and didn't trust herself.

## 2022-05-02 NOTE — ED NOTES
Pt resting in rm, easy, equal respirations. Pt remains calm, cooperative, under direct obs of designee. Pt reporting to have increased anxiety, ERP made aware.

## 2022-05-02 NOTE — ED NOTES
"Pt. Given tylenol for HA. States she is feeling like \"the anxiety is a little better.\" Remains in view of sitter. Denies further needs.   "

## 2022-05-02 NOTE — DISCHARGE PLANNING
Alert Team Note:    Contacted [facility], spoke to Pia. Pt packet has been received and is being reviewed. Possibility of bed availability later this afternoon.

## 2022-05-03 NOTE — DISCHARGE SUMMARY
"  ED Observation Discharge Summary      Patient:Joseline Henderson  Patient : 2000  Patient MRN: [id number]  Patient PCP: Pcp Pt States None    Admit Date: 2022  Discharge Date and Time: 22 3:41 PM  Discharge Diagnosis: Depression, anxiety  Discharge Attending: Sharon Banegas M.D.  Discharge Service: ED Observation    ED Course  Joseline is a 22 y.o. female who was evaluated at [facility] emergency department for suicidal ideation.  Patient was seen by psychiatry this afternoon.  She will be started on Seroquel 25 mg nightly for mood stabilization.  She was given referrals to outpatient psychiatric resources.  Patient will be discharged with this medication prescription.  Patient was reevaluated and is agreeable to this plan.    Discharge Exam:  /75   Pulse 76   Temp 36.8 °C (98.3 °F) (Temporal)   Resp 14   Ht 1.6 m (5' 3\")   Wt 56.9 kg (125 lb 7.1 oz)   LMP 2022   SpO2 99%   BMI 22.22 kg/m² .    Constitutional: Awake and alert. Nontoxic  HENT:  Grossly normal  Eyes: Grossly normal  Neck: Normal range of motion  Cardiovascular: Normal heart rate   Thorax & Lungs: No respiratory distress  Abdomen: Nontender  Skin:  No pathologic rash.   Extremities: Well perfused  Psychiatric: Affect normal    Labs  Results for orders placed or performed during the hospital encounter of 22   Urine Drug Screen   Result Value Ref Range    Amphetamines Urine Negative Negative    Barbiturates Negative Negative    Benzodiazepines Positive (A) Negative    Cocaine Metabolite Negative Negative    Methadone Negative Negative    Opiates Negative Negative    Oxycodone Negative Negative    Phencyclidine -Pcp Negative Negative    Propoxyphene Negative Negative    Cannabinoid Metab Positive (A) Negative   BETA-HCG QUALITATIVE URINE   Result Value Ref Range    Beta-Hcg Urine Negative Negative   SARS-COV Antigen FRANSISCA   Result Value Ref Range    SARS-CoV-2 Source Nasal Swab     SARS-COV ANTIGEN FRANSISCA " NotDetected NotDetected   POC BREATHALIZER   Result Value Ref Range    POC Breathalizer 0.00 0.00 - 0.01 Percent   POC BREATHALIZER   Result Value Ref Range    POC Breathalizer 0.00 0.00 - 0.01 Percent       Radiology  No orders to display         Medications:   New Prescriptions    No medications on file       Discharge Condition: Stable    Electronically signed by: Sharno Banegas M.D., 5/2/2022 8:45 PM
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
## 2022-05-10 ENCOUNTER — HOSPITAL ENCOUNTER (EMERGENCY)
Facility: MEDICAL CENTER | Age: 22
End: 2022-05-10
Attending: EMERGENCY MEDICINE
Payer: MEDICAID

## 2022-05-10 ENCOUNTER — APPOINTMENT (OUTPATIENT)
Dept: RADIOLOGY | Facility: MEDICAL CENTER | Age: 22
End: 2022-05-10
Payer: MEDICAID

## 2022-05-10 ENCOUNTER — APPOINTMENT (OUTPATIENT)
Dept: RADIOLOGY | Facility: MEDICAL CENTER | Age: 22
End: 2022-05-10
Attending: EMERGENCY MEDICINE
Payer: MEDICAID

## 2022-05-10 VITALS
TEMPERATURE: 97.8 F | DIASTOLIC BLOOD PRESSURE: 73 MMHG | BODY MASS INDEX: 20.49 KG/M2 | RESPIRATION RATE: 18 BRPM | OXYGEN SATURATION: 97 % | WEIGHT: 120 LBS | HEIGHT: 64 IN | SYSTOLIC BLOOD PRESSURE: 110 MMHG | HEART RATE: 79 BPM

## 2022-05-10 DIAGNOSIS — F41.8 SITUATIONAL ANXIETY: ICD-10-CM
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
## 2022-05-10 LAB — EKG IMPRESSION: NORMAL

## 2022-05-10 PROCEDURE — 305948 HCHG GREEN TRAUMA ACT PRE-NOTIFY NO CC

## 2022-05-10 PROCEDURE — 96374 THER/PROPH/DIAG INJ IV PUSH: CPT

## 2022-05-10 PROCEDURE — 99284 EMERGENCY DEPT VISIT MOD MDM: CPT

## 2022-05-10 PROCEDURE — 71045 X-RAY EXAM CHEST 1 VIEW: CPT

## 2022-05-10 PROCEDURE — 700111 HCHG RX REV CODE 636 W/ 250 OVERRIDE (IP): Performed by: EMERGENCY MEDICINE

## 2022-05-10 PROCEDURE — 93005 ELECTROCARDIOGRAM TRACING: CPT | Performed by: EMERGENCY MEDICINE

## 2022-05-10 RX ORDER — LORAZEPAM 2 MG/ML
INJECTION INTRAMUSCULAR
Status: COMPLETED | OUTPATIENT
Start: 2022-05-10 | End: 2022-05-10

## 2022-05-10 RX ADMIN — LORAZEPAM 1 MG: 2 INJECTION INTRAMUSCULAR; INTRAVENOUS at 11:26

## 2022-05-10 NOTE — ED NOTES
Patient discharged per order. Oral and written discharge instructions reviewed. IV removed. All belongings accounted for and taken with patient. Questions answered, and patient agrees with discharge plan. Patient much more calm than previously.
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
## 2022-05-10 NOTE — ED NOTES
Patient to [facility]. Very tearful and anxious. Patient complains of mild chest discomfort. EKG done.

## 2022-05-17 ENCOUNTER — APPOINTMENT (OUTPATIENT)
Dept: RADIOLOGY | Facility: MEDICAL CENTER | Age: 22
End: 2022-05-17
Attending: EMERGENCY MEDICINE
Payer: MEDICAID

## 2022-05-17 ENCOUNTER — HOSPITAL ENCOUNTER (EMERGENCY)
Facility: MEDICAL CENTER | Age: 22
End: 2022-05-17
Attending: EMERGENCY MEDICINE
Payer: MEDICAID

## 2022-05-17 VITALS
BODY MASS INDEX: 21.91 KG/M2 | OXYGEN SATURATION: 96 % | TEMPERATURE: 98.6 F | HEIGHT: 63 IN | SYSTOLIC BLOOD PRESSURE: 116 MMHG | DIASTOLIC BLOOD PRESSURE: 70 MMHG | RESPIRATION RATE: 16 BRPM | WEIGHT: 123.68 LBS | HEART RATE: 98 BPM

## 2022-05-17 DIAGNOSIS — F41.9 ANXIETY: ICD-10-CM

## 2022-05-17 DIAGNOSIS — R55 SYNCOPE, UNSPECIFIED SYNCOPE TYPE: ICD-10-CM

## 2022-05-17 DIAGNOSIS — T50.905A ADVERSE EFFECT OF DRUG, INITIAL ENCOUNTER: ICD-10-CM

## 2022-05-17 LAB
ALBUMIN SERPL BCP-MCNC: 4.4 G/DL (ref 3.2–4.9)
ALBUMIN/GLOB SERPL: 1.8 G/DL
ALP SERPL-CCNC: 49 U/L (ref 30–99)
ALT SERPL-CCNC: 11 U/L (ref 2–50)
ANION GAP SERPL CALC-SCNC: 10 MMOL/L (ref 7–16)
AST SERPL-CCNC: 14 U/L (ref 12–45)
BASOPHILS # BLD AUTO: 0.3 % (ref 0–1.8)
BASOPHILS # BLD: 0.02 K/UL (ref 0–0.12)
BILIRUB SERPL-MCNC: 0.4 MG/DL (ref 0.1–1.5)
BUN SERPL-MCNC: 11 MG/DL (ref 8–22)
CALCIUM SERPL-MCNC: 9.3 MG/DL (ref 8.5–10.5)
CHLORIDE SERPL-SCNC: 110 MMOL/L (ref 96–112)
CO2 SERPL-SCNC: 21 MMOL/L (ref 20–33)
CREAT SERPL-MCNC: 0.5 MG/DL (ref 0.5–1.4)
EKG IMPRESSION: NORMAL
EOSINOPHIL # BLD AUTO: 0.06 K/UL (ref 0–0.51)
EOSINOPHIL NFR BLD: 0.8 % (ref 0–6.9)
ERYTHROCYTE [DISTWIDTH] IN BLOOD BY AUTOMATED COUNT: 41.1 FL (ref 35.9–50)
GFR SERPLBLD CREATININE-BSD FMLA CKD-EPI: 136 ML/MIN/1.73 M 2
GLOBULIN SER CALC-MCNC: 2.5 G/DL (ref 1.9–3.5)
GLUCOSE SERPL-MCNC: 104 MG/DL (ref 65–99)
HCG SERPL QL: NEGATIVE
HCT VFR BLD AUTO: 38.8 % (ref 37–47)
HGB BLD-MCNC: 13.3 G/DL (ref 12–16)
IMM GRANULOCYTES # BLD AUTO: 0.03 K/UL (ref 0–0.11)
IMM GRANULOCYTES NFR BLD AUTO: 0.4 % (ref 0–0.9)
LYMPHOCYTES # BLD AUTO: 2.6 K/UL (ref 1–4.8)
LYMPHOCYTES NFR BLD: 32.8 % (ref 22–41)
MCH RBC QN AUTO: 29.4 PG (ref 27–33)
MCHC RBC AUTO-ENTMCNC: 34.3 G/DL (ref 33.6–35)
MCV RBC AUTO: 85.8 FL (ref 81.4–97.8)
MONOCYTES # BLD AUTO: 0.54 K/UL (ref 0–0.85)
MONOCYTES NFR BLD AUTO: 6.8 % (ref 0–13.4)
NEUTROPHILS # BLD AUTO: 4.67 K/UL (ref 2–7.15)
NEUTROPHILS NFR BLD: 58.9 % (ref 44–72)
NRBC # BLD AUTO: 0 K/UL
NRBC BLD-RTO: 0 /100 WBC
PLATELET # BLD AUTO: 302 K/UL (ref 164–446)
PMV BLD AUTO: 11.1 FL (ref 9–12.9)
POC BREATHALIZER: 0 PERCENT (ref 0–0.01)
POTASSIUM SERPL-SCNC: 3.7 MMOL/L (ref 3.6–5.5)
PROT SERPL-MCNC: 6.9 G/DL (ref 6–8.2)
RBC # BLD AUTO: 4.52 M/UL (ref 4.2–5.4)
SODIUM SERPL-SCNC: 141 MMOL/L (ref 135–145)
WBC # BLD AUTO: 7.9 K/UL (ref 4.8–10.8)

## 2022-05-17 PROCEDURE — 93005 ELECTROCARDIOGRAM TRACING: CPT | Performed by: EMERGENCY MEDICINE

## 2022-05-17 PROCEDURE — 36415 COLL VENOUS BLD VENIPUNCTURE: CPT

## 2022-05-17 PROCEDURE — 302970 POC BREATHALIZER: Performed by: EMERGENCY MEDICINE

## 2022-05-17 PROCEDURE — 84703 CHORIONIC GONADOTROPIN ASSAY: CPT

## 2022-05-17 PROCEDURE — 71045 X-RAY EXAM CHEST 1 VIEW: CPT

## 2022-05-17 PROCEDURE — 99284 EMERGENCY DEPT VISIT MOD MDM: CPT

## 2022-05-17 PROCEDURE — 80053 COMPREHEN METABOLIC PANEL: CPT

## 2022-05-17 PROCEDURE — 90791 PSYCH DIAGNOSTIC EVALUATION: CPT

## 2022-05-17 PROCEDURE — 85025 COMPLETE CBC W/AUTO DIFF WBC: CPT

## 2022-05-17 ASSESSMENT — FIBROSIS 4 INDEX: FIB4 SCORE: 0.42
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
## 2022-05-17 NOTE — ED NOTES
Pt resting in room with  at side. Pt awaiting behavioral health evaluation. No further needs at this time.
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
## 2022-05-17 NOTE — ED TRIAGE NOTES
.  Chief Complaint   Patient presents with   • Psych Eval      Pt ambulate to triage with steady gait. Pt reports taking Seroquel x 2 weeks, feels like the medication is making her anxiety and suicidal ideation worse. Pt is unable to eat more than one meal a day since departure from ED, feels light headed. Pt states she feels the medication is making all her symptoms worse.    Pt educated on triage process. Charge RN notified of Pt.

## 2022-05-17 NOTE — ED NOTES
Pt ambulated to G37,  at bedside. Changed into gown. Pt reports when she eats she becomes really nauseated.

## 2022-05-18 NOTE — DISCHARGE INSTRUCTIONS
Make sure you follow-up with [facility] and psychiatry as scheduled.  I do feel you need an adjustment of your medication but unfortunately I do not have any resources to get you into an appointment sooner here.  We are always here to help.

## 2022-05-18 NOTE — DISCHARGE PLANNING
"Pt tearful, angry. States \"the only reason I came here was because of the Seroquel.\" Reviewed statements patient made about suicidal thoughts while having panic attacks. Pt states she wants to go home to stay with family. Consult to psychiatry ordered.  "

## 2022-05-18 NOTE — ED NOTES
ERP at bedside to discuss plan with pt. Pt is very agitated and upset with plan. ERP discussed further options with pt, and pt is preferring other options.

## 2022-05-29 ENCOUNTER — OFFICE VISIT (OUTPATIENT)
Dept: URGENT CARE | Facility: CLINIC | Age: 22
End: 2022-05-29
Payer: MEDICAID

## 2022-05-29 VITALS
HEIGHT: 63 IN | OXYGEN SATURATION: 98 % | DIASTOLIC BLOOD PRESSURE: 70 MMHG | RESPIRATION RATE: 20 BRPM | HEART RATE: 91 BPM | BODY MASS INDEX: 21.26 KG/M2 | SYSTOLIC BLOOD PRESSURE: 118 MMHG | WEIGHT: 120 LBS | TEMPERATURE: 98.5 F

## 2022-05-29 DIAGNOSIS — F41.9 ANXIETY: ICD-10-CM

## 2022-05-29 DIAGNOSIS — U07.1 COVID-19: ICD-10-CM

## 2022-05-29 PROCEDURE — 99213 OFFICE O/P EST LOW 20 MIN: CPT

## 2022-05-29 ASSESSMENT — ENCOUNTER SYMPTOMS
DIARRHEA: 0
SORE THROAT: 0
MYALGIAS: 1
ABDOMINAL PAIN: 0
COUGH: 1
NAUSEA: 0
SHORTNESS OF BREATH: 1
FEVER: 1
VOMITING: 0

## 2022-05-29 ASSESSMENT — FIBROSIS 4 INDEX: FIB4 SCORE: 0.31

## 2022-05-29 NOTE — PROGRESS NOTES
"Subjective     Joseline Henderson is a 22 y.o. female who presents with Coronavirus Screening (shortness of breath, ear ringing, and constant headache, pain when taking deep breathes. Covid positive exposure, did have positive rapid test )        HPI     Patient presents today with positive COVID rapid test 5/27/2022 done at [facility].  Patient reports headache, nasal congestion, myalgia, fatigue.  Yesterday, she reports developing a fever of 101 °F.  This was also accompanied by cough.  Patient further endorses feeling chest tightness and shortness of breath.  She denies any nausea, vomiting, diarrhea.    Patient's current problem list, medications, and past medical/surgical history were reviewed in Epic.    PMH:  has a past medical history of Anxiety disorder, Bipolar affective (HCC), Depression, and Psychiatric problem.  MEDS: No current outpatient medications on file.  ALLERGIES: No Known Allergies  SURGHX: No past surgical history on file.  SOCHX:  reports that she has never smoked. She has never used smokeless tobacco. She reports current alcohol use. She reports current drug use. Drug: Inhaled.  FH: Reviewed with patient, not pertinent to this visit.           Review of Systems   Constitutional: Positive for fever. Negative for malaise/fatigue.   HENT: Negative for sore throat.    Respiratory: Positive for cough and shortness of breath.    Cardiovascular: Negative for chest pain.   Gastrointestinal: Negative for abdominal pain, diarrhea, nausea and vomiting.   Musculoskeletal: Positive for myalgias.   Skin: Negative.             Objective     /70   Pulse 91   Temp 36.9 °C (98.5 °F) (Temporal)   Resp 20   Ht 1.6 m (5' 3\")   Wt 54.4 kg (120 lb)   SpO2 98%   BMI 21.26 kg/m²      Physical Exam  Constitutional:       Appearance: Normal appearance.   HENT:      Head: Normocephalic.      Nose: Nose normal.   Eyes:      Extraocular Movements: Extraocular movements intact.   Cardiovascular:      Rate " and Rhythm: Normal rate and regular rhythm.      Pulses: Normal pulses.      Heart sounds: Normal heart sounds.   Pulmonary:      Effort: Pulmonary effort is normal.      Breath sounds: Normal breath sounds.   Musculoskeletal:         General: Normal range of motion.      Cervical back: Normal range of motion.   Skin:     General: Skin is warm and dry.   Neurological:      General: No focal deficit present.      Mental Status: She is alert.   Psychiatric:         Mood and Affect: Mood is anxious.         Behavior: Behavior normal.                Assessment & Plan     1. COVID-19      2. Anxiety      Patient is mostly here today for concerns due to her symptoms.  Explained to patient that all the symptoms she is experiencing is part of COVID infection.  Patient appears anxious.  She does endorse feeling short of breath, but her vitals and physical exam in the clinic are unremarkable.  Advised patient on anxiety relieving techniques.  Based on her clinical record, patient has a long history of anxiety.  She is not currently on any medications for this. Discussed treatment plan with patient, she is agreeable and verbalized understanding. Educated patient on signs and symptoms watch out for, when to return to the clinic or go to the ER.    Recommended supportive treatment at home:  OTC Tylenol or Motrin for fever/discomfort.  OTC supportive care for nasal congestion - saline nasal spray/Flonase nasal spray and/or netipot  Humidifier and steam inhalation/warm showers.  Increase oral fluid intake.    Differential diagnoses, supportive care, and indications for immediate follow-up discussed with patient. Pathogenesis of diagnosis discussed including typical length and natural progression.     Instructed to return to clinic or nearest emergency department for any change in condition, further concerns, or worsening of symptoms.       Electronically Signed by MELONY Rivera

## 2023-03-01 ENCOUNTER — HOSPITAL ENCOUNTER (EMERGENCY)
Facility: MEDICAL CENTER | Age: 23
End: 2023-03-01
Attending: EMERGENCY MEDICINE
Payer: COMMERCIAL

## 2023-03-01 ENCOUNTER — OFFICE VISIT (OUTPATIENT)
Dept: URGENT CARE | Facility: PHYSICIAN GROUP | Age: 23
End: 2023-03-01
Payer: COMMERCIAL

## 2023-03-01 VITALS
OXYGEN SATURATION: 98 % | TEMPERATURE: 98.4 F | HEART RATE: 95 BPM | RESPIRATION RATE: 16 BRPM | SYSTOLIC BLOOD PRESSURE: 98 MMHG | BODY MASS INDEX: 21.42 KG/M2 | WEIGHT: 124.78 LBS | DIASTOLIC BLOOD PRESSURE: 66 MMHG

## 2023-03-01 VITALS
RESPIRATION RATE: 14 BRPM | HEART RATE: 89 BPM | BODY MASS INDEX: 21.75 KG/M2 | DIASTOLIC BLOOD PRESSURE: 78 MMHG | TEMPERATURE: 99.2 F | OXYGEN SATURATION: 99 % | SYSTOLIC BLOOD PRESSURE: 112 MMHG | WEIGHT: 127.43 LBS | HEIGHT: 64 IN

## 2023-03-01 DIAGNOSIS — Z3A.01 LESS THAN 8 WEEKS GESTATION OF PREGNANCY: ICD-10-CM

## 2023-03-01 DIAGNOSIS — O21.0 MORNING SICKNESS: ICD-10-CM

## 2023-03-01 DIAGNOSIS — F41.9 ANXIETY: ICD-10-CM

## 2023-03-01 DIAGNOSIS — F41.9 ANXIETY AND DEPRESSION: ICD-10-CM

## 2023-03-01 DIAGNOSIS — F32.A ANXIETY AND DEPRESSION: ICD-10-CM

## 2023-03-01 DIAGNOSIS — R45.851 SUICIDAL IDEATIONS: ICD-10-CM

## 2023-03-01 LAB
HCG UR QL: POSITIVE
POCT INT CON NEG: NEGATIVE
POCT INT CON POS: POSITIVE
POCT URINE PREGNANCY TEST: POSITIVE

## 2023-03-01 PROCEDURE — 81025 URINE PREGNANCY TEST: CPT

## 2023-03-01 PROCEDURE — 90791 PSYCH DIAGNOSTIC EVALUATION: CPT

## 2023-03-01 PROCEDURE — 99215 OFFICE O/P EST HI 40 MIN: CPT | Performed by: PHYSICIAN ASSISTANT

## 2023-03-01 PROCEDURE — 700111 HCHG RX REV CODE 636 W/ 250 OVERRIDE (IP): Performed by: EMERGENCY MEDICINE

## 2023-03-01 PROCEDURE — 99285 EMERGENCY DEPT VISIT HI MDM: CPT

## 2023-03-01 PROCEDURE — 81025 URINE PREGNANCY TEST: CPT | Performed by: PHYSICIAN ASSISTANT

## 2023-03-01 RX ORDER — DOXYLAMINE SUCCINATE AND PYRIDOXINE HYDROCHLORIDE, DELAYED RELEASE TABLETS 10 MG/10 MG 10; 10 MG/1; MG/1
TABLET, DELAYED RELEASE ORAL
Qty: 60 TABLET | Refills: 0 | Status: CANCELLED | OUTPATIENT
Start: 2023-03-01

## 2023-03-01 RX ORDER — HYDROXYZINE HYDROCHLORIDE 25 MG/1
25 TABLET, FILM COATED ORAL 3 TIMES DAILY PRN
Qty: 30 TABLET | Refills: 0 | Status: SHIPPED | OUTPATIENT
Start: 2023-03-01

## 2023-03-01 RX ORDER — ONDANSETRON 4 MG/1
4 TABLET, ORALLY DISINTEGRATING ORAL EVERY 6 HOURS PRN
Qty: 10 TABLET | Refills: 0 | Status: SHIPPED | OUTPATIENT
Start: 2023-03-01

## 2023-03-01 RX ORDER — ONDANSETRON 4 MG/1
4 TABLET, ORALLY DISINTEGRATING ORAL ONCE
Status: COMPLETED | OUTPATIENT
Start: 2023-03-01 | End: 2023-03-01

## 2023-03-01 RX ADMIN — ONDANSETRON 4 MG: 4 TABLET, ORALLY DISINTEGRATING ORAL at 15:06

## 2023-03-01 ASSESSMENT — FIBROSIS 4 INDEX
FIB4 SCORE: 0.32
FIB4 SCORE: 0.32

## 2023-03-01 ASSESSMENT — ENCOUNTER SYMPTOMS
FEVER: 1
HALLUCINATIONS: 0
VOMITING: 1
PALPITATIONS: 1
CHILLS: 1
SHORTNESS OF BREATH: 1
NAUSEA: 1
NERVOUS/ANXIOUS: 1
DEPRESSION: 1

## 2023-03-01 NOTE — PROGRESS NOTES
Subjective:   Joseline Henderson is a 23 y.o. female who presents for Otalgia (Left ear pain and ringing x 1 week ) and Nausea (Causing anxiety about 5 weeks pregnant . Requesting a Rx for nausea . Appt with OB-GYN is not until 2023 )         female with history of anxiety, depression, bipolar disorder, and bulimia who is estimated to be 5 weeks pregnant presents with concerns of uncontrolled anxiety and depression since becoming pregnant and new suicidal ideations over the last week.  States that she is having difficulty controlling her impulses and is concerned that she may do something to harm herself.  She has history of cutting/self harm and suicide attempts.  She is currently in between psychiatrists and therapists.  She is not on any medications for her symptoms.  She has been experiencing severe morning sickness and feels that it is exacerbating her anxiety. She endorses frequent panic attacks, SOB, racing heart.  She plans to terminate the pregnancy as she feels it is adversely affecting her health and has an appointment for an  consult on 3/23/2023. She feels like she cannot wait that long and is fearful that she might kill herself if she cannot terminate the pregnancy sooner. First day last menstrual period 2023.    Review of Systems   Constitutional:  Positive for chills and fever.   Respiratory:  Positive for shortness of breath.    Cardiovascular:  Positive for palpitations.   Gastrointestinal:  Positive for nausea and vomiting.   Psychiatric/Behavioral:  Positive for depression and suicidal ideas. Negative for hallucinations. The patient is nervous/anxious.      PMH:  has a past medical history of Anxiety disorder, Bipolar affective (HCC), Depression, and Psychiatric problem.  MEDS: No current outpatient medications on file.  ALLERGIES: No Known Allergies  SURGHX: History reviewed. No pertinent surgical history.  SOCHX:  reports that she has never smoked. She has never used  "smokeless tobacco. She reports current alcohol use. She reports current drug use. Drug: Inhaled.  FH: Family history was reviewed, no pertinent findings to report   Objective:   /78 (BP Location: Left arm, Patient Position: Sitting, BP Cuff Size: Adult)   Pulse 89   Temp 37.3 °C (99.2 °F) (Temporal)   Resp 14   Ht 1.626 m (5' 4\")   Wt 57.8 kg (127 lb 6.8 oz)   SpO2 99%   BMI 21.87 kg/m²   Physical Exam  Vitals reviewed.   Constitutional:       Appearance: Normal appearance.      Comments: Distressed and tearful   HENT:      Head: Normocephalic and atraumatic.      Right Ear: External ear normal.      Left Ear: External ear normal.      Nose: Nose normal.   Cardiovascular:      Rate and Rhythm: Normal rate and regular rhythm.   Pulmonary:      Effort: Pulmonary effort is normal. No respiratory distress.      Breath sounds: No stridor.   Skin:     General: Skin is dry.   Neurological:      Comments: Alert and oriented.    Psychiatric:      Comments: Anxious and emotional labile         Results for orders placed or performed in visit on 03/01/23   POCT Pregnancy   Result Value Ref Range    POC Urine Pregnancy Test Positive     Internal Control Positive Positive     Internal Control Negative Negative        Assessment/Plan:   1. Suicidal ideations    2. Anxiety and depression    3. Morning sickness    4. Less than 8 weeks gestation of pregnancy  - POCT Pregnancy      Discussed outpatient management with close follow-up versus further evaluation in the emergency department today.  After extensive discussion with both patient and her , patient admits that she currently feels unstable and believes that she may be a danger to herself.  Through shared decision making patient decided to go to the emergency department today for further evaluation and management.  Her  will drive her directly.  Transfer center contacted to inform of patient disposition and impending arrival.    Differential diagnosis, " natural history, supportive care, and indications for immediate follow-up discussed.

## 2023-03-01 NOTE — ED TRIAGE NOTES
Chief Complaint   Patient presents with    Anxiety    Pregnancy    Suicidal Ideation     Pt reports panic attacks since finding out she was pregnant. Plans to have  3/20/23.  Pt reports that since she has been having suicidal thoughts with no intention.  LOW risk on columbia scale. Pt also reports nausea and vomiting.   Charge RN notified.  Pt to G33

## 2023-03-01 NOTE — ED PROVIDER NOTES
ED Provider Note    CHIEF COMPLAINT  Chief Complaint   Patient presents with    Anxiety    Pregnancy    Suicidal Ideation       EXTERNAL RECORDS REVIEWED  Outpatient Notes outpatient note completed on 3/1/2023 by HILLARY Sibley stating that patient feels unstable secondary to her recent diagnosis of pregnancy and has profound anxiety was instructed on the emergency department for evaluation.    HPI/SURI      Joseline Henderson is a 23 y.o. female who presents with last menstrual period on January 23 and she is a G2, P1 female.  She recently tested positive for pregnancy.  She has history of bipolar and anxiety and states that currently she cannot handle the fact that she is pregnant and is very stressed out.  She does not take medications for anxiety.  She has had suicidal ideations in the past but she has had no thoughts of hurting herself currently.  She is taking Vistaril currently for this as well.  She does plan on terminating this pregnancy and does not have an appointment till the 20th of this month and states it is way too long for her to wait and is causing her more anxiety and she cannot handle anxiety.  Fever, shakes, chills, sweats, nausea, vomit, abdominal pain, vaginal bleeding, vaginal discharge.    PAST MEDICAL HISTORY   has a past medical history of Anxiety disorder, Bipolar affective (HCC), Depression, and Psychiatric problem.    SURGICAL HISTORY  patient denies any surgical history    FAMILY HISTORY  No family history on file.    SOCIAL HISTORY  Social History     Tobacco Use    Smoking status: Never    Smokeless tobacco: Never   Vaping Use    Vaping Use: Some days   Substance and Sexual Activity    Alcohol use: Yes     Comment: 2 drinks biweekly    Drug use: Yes     Types: Inhaled     Comment: marijuana     Sexual activity: Not on file       CURRENT MEDICATIONS  Home Medications       Reviewed by Tiff Durant R.N. (Registered Nurse) on 03/01/23 at 1306  Med List Status: Partial     Medication  Last Dose Status        Patient Samuel Taking any Medications                           ALLERGIES  No Known Allergies    PHYSICAL EXAM  VITAL SIGNS: BP 98/66   Pulse 95   Temp 36.9 °C (98.4 °F) (Temporal)   Resp 16   Wt 56.6 kg (124 lb 12.5 oz)   LMP 01/23/2023   SpO2 98%   BMI 21.42 kg/m²      Nursing notes and vitals reviewed.  Constitutional: Well developed, Well nourished, No acute distress, Non-toxic appearance.   Eyes: PERRLA, EOMI, Conjunctiva normal, No discharge.   Thorax & Lungs: No respiratory distress, No rales, No rhonchi, No wheezing, No chest tenderness.   Abdomen: Bowel sounds normal, Soft, No tenderness, No guarding, No rebound, No masses, No pulsatile masses.   Skin: Warm, Dry, No erythema, No rash.   Extremities: No deformity, no edema, good range of motion range of motion upper lower extremes bilaterally  Neurologic: Alert & oriented x 3, no focal abnormalities noted, acting appropriately on examination  Psychiatric: Extremely anxious and tearful with pressured speech.      DIAGNOSTIC STUDIES / PROCEDURES    LABS  Results for orders placed or performed during the hospital encounter of 03/01/23   BETA-HCG QUALITATIVE URINE   Result Value Ref Range    Beta-Hcg Urine Positive (A) Negative           COURSE & MEDICAL DECISION MAKING    ED Observation Status? Yes; I am placing the patient in to an observation status due to a diagnostic uncertainty as well as therapeutic intensity. Patient placed in observation status at 1:53 PM, 3/1/2023.     Observation plan is as follows: Question with OB gynecology, urine test, discussion with our lifeskills  Upon Reevaluation, the patient's condition has: Improved; and will be discharged.    Patient discharged from ED Observation status at 1620 (Time) 3/1/23 (Date).     INITIAL ASSESSMENT, COURSE AND PLAN  Care Narrative: This is a pleasant 23-year-old female who presents with pregnancy as well as anxiety.  Had a long conversation with the patient concerning  her condition and is not a lot of medications being give her in her pregnant state.  I discussed the patient our pharmacist who states Vistaril is basically the only thing we can give her.  I did discuss the patient with gynecology Dr. Beasley who states that this point there is no need for further evaluation of the patient and to follow-up with Dr. Ribera as an outpatient for termination of this pregnancy.  I did add text from the Social DJ individual and the patient feels much more calm and peaceful concerned the fact that she obtain an appointment for tomorrow for evaluation and possible elective .  Patient denies any suicidal homicidal ideation.  I did give her a prescription for Atarax and she is to follow-up as needed.       ADDITIONAL PROBLEM LIST  Bipolar, chronic anxiety: We will take Atarax for this and return as needed.  DISPOSITION AND DISCUSSIONS  I have discussed management of the patient with the following physicians and SHAINA's: Dr. Monzon gynecology recommends outpatient evaluation and management    Discussion of management with other HP or appropriate source(s): Behavioral Health stating the patient safe for discharge        FINAL DIAGNOSIS  1. Anxiety    2. Less than 8 weeks gestation of pregnancy        DISPOSITION:  Patient will be discharged home in stable condition.    FOLLOW UP:  [facility], Emergency Dept  [address] [facility] [id number] [phone]    If symptoms worsen    [facility]  [facility] [address]  [phone]    Schedule an appointment as soon as possible for a visit         OUTPATIENT MEDICATIONS:  Discharge Medication List as of 3/1/2023  4:45 PM        START taking these medications    Details   hydrOXYzine HCl (ATARAX) 25 MG Tab Take 1 Tablet by mouth 3 times a day as needed for Anxiety., Disp-30 Tablet, R-0, Normal      ondansetron (ZOFRAN ODT) 4 MG TABLET DISPERSIBLE Take 1 Tablet by mouth every 6 hours as needed for  Nausea/Vomiting., Disp-10 Tablet, R-0, Normal                  Electronically signed by: Brandon Salguero D.O., 3/1/2023 1:53 PM
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
## 2023-03-02 NOTE — ED NOTES
Discharge instructions given to pt. Prescriptions sent to pt's pharmacy. Pt educated, verbalizes understanding. All belongings accounted for. Pt ambulated out of ED with steady gait to go home with spouse.

## 2025-03-07 ENCOUNTER — HOSPITAL ENCOUNTER (EMERGENCY)
Facility: MEDICAL CENTER | Age: 25
End: 2025-03-07
Attending: STUDENT IN AN ORGANIZED HEALTH CARE EDUCATION/TRAINING PROGRAM
Payer: OTHER MISCELLANEOUS

## 2025-03-07 VITALS
TEMPERATURE: 96.9 F | RESPIRATION RATE: 16 BRPM | SYSTOLIC BLOOD PRESSURE: 114 MMHG | HEIGHT: 63 IN | HEART RATE: 58 BPM | OXYGEN SATURATION: 99 % | WEIGHT: 115.96 LBS | BODY MASS INDEX: 20.55 KG/M2 | DIASTOLIC BLOOD PRESSURE: 76 MMHG

## 2025-03-07 DIAGNOSIS — F41.0 PANIC ATTACK: ICD-10-CM

## 2025-03-07 DIAGNOSIS — S06.0X1D CONCUSSION WITH LOSS OF CONSCIOUSNESS OF 30 MINUTES OR LESS, SUBSEQUENT ENCOUNTER: ICD-10-CM

## 2025-03-07 DIAGNOSIS — F41.9 ANXIETY: Primary | ICD-10-CM

## 2025-03-07 PROCEDURE — 99283 EMERGENCY DEPT VISIT LOW MDM: CPT

## 2025-03-07 PROCEDURE — A9270 NON-COVERED ITEM OR SERVICE: HCPCS | Mod: UD | Performed by: STUDENT IN AN ORGANIZED HEALTH CARE EDUCATION/TRAINING PROGRAM

## 2025-03-07 PROCEDURE — 700102 HCHG RX REV CODE 250 W/ 637 OVERRIDE(OP): Mod: UD | Performed by: STUDENT IN AN ORGANIZED HEALTH CARE EDUCATION/TRAINING PROGRAM

## 2025-03-07 RX ORDER — LORAZEPAM 1 MG/1
0.5 TABLET ORAL ONCE
Status: COMPLETED | OUTPATIENT
Start: 2025-03-07 | End: 2025-03-07

## 2025-03-07 RX ORDER — ACETAMINOPHEN 325 MG/1
650 TABLET ORAL ONCE
Status: COMPLETED | OUTPATIENT
Start: 2025-03-07 | End: 2025-03-07

## 2025-03-07 RX ORDER — HYDROXYZINE HYDROCHLORIDE 25 MG/1
25 TABLET, FILM COATED ORAL 3 TIMES DAILY PRN
Qty: 30 TABLET | Refills: 0 | Status: SHIPPED | OUTPATIENT
Start: 2025-03-07 | End: 2025-03-07

## 2025-03-07 RX ORDER — HYDROXYZINE HYDROCHLORIDE 25 MG/1
25 TABLET, FILM COATED ORAL 3 TIMES DAILY PRN
Qty: 30 TABLET | Refills: 0 | Status: SHIPPED | OUTPATIENT
Start: 2025-03-07

## 2025-03-07 RX ADMIN — ACETAMINOPHEN 650 MG: 325 TABLET ORAL at 19:39

## 2025-03-07 RX ADMIN — LORAZEPAM 0.5 MG: 1 TABLET ORAL at 18:45

## 2025-03-07 ASSESSMENT — PAIN DESCRIPTION - PAIN TYPE: TYPE: ACUTE PAIN

## 2025-03-08 NOTE — ED NOTES
Received report from Noble RODRIGUEZ & Jeniffer HEALY RN. Upon shift change pt is resting in bed with even and unlabored breaths, in no apparent distress. Pt is connected to monitoring devices and is on room air, tolerating well. Will continue to monitor.
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
## 2025-03-08 NOTE — ED NOTES
Pt ambulatory to [facility] from [facility]. Triage note reviewed and agreed with.  Dressed down to gown, placed on continuous monitoring, call light within reach.  Chart up for ERP.

## 2025-03-08 NOTE — DISCHARGE INSTRUCTIONS
You are seen and evaluated in the emergency department for your concussion as well as for anxiety.  You received some medications here with some mild improvement in symptoms.  I continue to recommend using ibuprofen, acetaminophen, Zofran as needed for symptomatic treatment of your concussion.  Please make sure you are staying well-hydrated, getting adequate sleep.  I have placed a referral for you to establish with a primary care provider in case your symptoms linger for longer than normal.  Please follow-up with the above clinics you are unable to establish with a primary care provider or return to the emergency department for any other concerning symptoms.

## 2025-06-24 ENCOUNTER — HOSPITAL ENCOUNTER (EMERGENCY)
Facility: MEDICAL CENTER | Age: 25
End: 2025-06-24
Attending: EMERGENCY MEDICINE
Payer: MEDICAID

## 2025-06-24 VITALS
TEMPERATURE: 98.3 F | RESPIRATION RATE: 16 BRPM | HEART RATE: 64 BPM | WEIGHT: 115 LBS | SYSTOLIC BLOOD PRESSURE: 107 MMHG | HEIGHT: 64 IN | BODY MASS INDEX: 19.63 KG/M2 | OXYGEN SATURATION: 98 % | DIASTOLIC BLOOD PRESSURE: 63 MMHG

## 2025-06-24 DIAGNOSIS — S39.012A STRAIN OF LUMBAR REGION, INITIAL ENCOUNTER: Primary | ICD-10-CM

## 2025-06-24 PROCEDURE — 700111 HCHG RX REV CODE 636 W/ 250 OVERRIDE (IP): Mod: JZ,UD | Performed by: EMERGENCY MEDICINE

## 2025-06-24 PROCEDURE — 96372 THER/PROPH/DIAG INJ SC/IM: CPT

## 2025-06-24 PROCEDURE — A9270 NON-COVERED ITEM OR SERVICE: HCPCS | Mod: UD | Performed by: EMERGENCY MEDICINE

## 2025-06-24 PROCEDURE — 700102 HCHG RX REV CODE 250 W/ 637 OVERRIDE(OP): Mod: UD | Performed by: EMERGENCY MEDICINE

## 2025-06-24 PROCEDURE — 99283 EMERGENCY DEPT VISIT LOW MDM: CPT

## 2025-06-24 RX ORDER — DIAZEPAM 5 MG/1
5 TABLET ORAL ONCE
Status: COMPLETED | OUTPATIENT
Start: 2025-06-24 | End: 2025-06-24

## 2025-06-24 RX ORDER — IBUPROFEN 600 MG/1
600 TABLET, FILM COATED ORAL EVERY 6 HOURS PRN
Qty: 30 TABLET | Refills: 0 | Status: SHIPPED | OUTPATIENT
Start: 2025-06-24

## 2025-06-24 RX ORDER — KETOROLAC TROMETHAMINE 15 MG/ML
15 INJECTION, SOLUTION INTRAMUSCULAR; INTRAVENOUS ONCE
Status: COMPLETED | OUTPATIENT
Start: 2025-06-24 | End: 2025-06-24

## 2025-06-24 RX ORDER — LIDOCAINE 4 G/G
1 PATCH TOPICAL EVERY 24 HOURS
Qty: 10 PATCH | Refills: 0 | Status: SHIPPED | OUTPATIENT
Start: 2025-06-24

## 2025-06-24 RX ORDER — PREDNISONE 50 MG/1
50 TABLET ORAL DAILY
Qty: 5 TABLET | Refills: 0 | Status: SHIPPED | OUTPATIENT
Start: 2025-06-24 | End: 2025-06-29

## 2025-06-24 RX ORDER — METHOCARBAMOL 750 MG/1
1500 TABLET, FILM COATED ORAL 3 TIMES DAILY
Qty: 60 TABLET | Refills: 0 | Status: SHIPPED | OUTPATIENT
Start: 2025-06-24 | End: 2025-07-04

## 2025-06-24 RX ADMIN — DIAZEPAM 5 MG: 5 TABLET ORAL at 10:15

## 2025-06-24 RX ADMIN — KETOROLAC TROMETHAMINE 15 MG: 15 INJECTION, SOLUTION INTRAMUSCULAR; INTRAVENOUS at 10:13

## 2025-06-24 ASSESSMENT — PAIN DESCRIPTION - PAIN TYPE: TYPE: ACUTE PAIN

## 2025-06-24 NOTE — Clinical Note
Joseline Henderson was seen and treated in our emergency department on 6/24/2025.  She may return to work on 06/26/2025.  Recommend no lifting over 10 pounds for 2 weeks.     If you have any questions or concerns, please don't hesitate to call.      Quang Hardy M.D.

## 2025-06-24 NOTE — ED TRIAGE NOTES
Chief Complaint   Patient presents with    Low Back Pain     Bent down to grab her purse and heard pop , pain since     Pt wheeled to triage c/o lower back pain started yesterday after bending to  her purse. Denies numbness or incontinence, pt has been taking otc medications but no relief.

## 2025-06-24 NOTE — ED NOTES
Pt to room via WC. Able to transfer over to the bed with assistance. Pt changed into gown and placed on monitors. Chart up for ERP.

## 2025-06-24 NOTE — ED PROVIDER NOTES
"ED Provider Note    Primary care provider: Pcp Pt States None    CHIEF COMPLAINT  Chief Complaint   Patient presents with    Low Back Pain     Bent down to grab her purse and heard pop , pain since       HPI  Joseline Henderson is a 25 y.o. female who presents to the Emergency Department for moderate left-sided low back pain, nonradiating.  The patient bent over to  her purse yesterday, felt a pop in the lower lumbar region.  Denies any numbness or weakness.  Has been getting around with help from her significant other.  Works as a [employer]  at [employer].  Does not smoke, does smoke THC.  Took 2 g total of ibuprofen yesterday and around 1 g of Tylenol yesterday with minimal improvement.  Spent most of the day in bed.    External record review: Patient seen in emergency department a few months ago for anxiety and possible concussion.  Was seen in the emergency department 2 years ago for anxiety and suicidal ideation.  Has been seen at the [facility] 2 years ago for anxiety, depression, bipolar, bulimia.  No recent scheduled medications in the prescription monitoring website.    PAST MEDICAL HISTORY   has a past medical history of Anxiety disorder, Bipolar affective (HCC), Depression, and Psychiatric problem.    SURGICAL HISTORY  patient denies any surgical history    PHYSICAL EXAM  VITAL SIGNS: /52   Pulse 68   Temp 36.8 °C (98.3 °F) (Temporal)   Resp 18   Ht 1.626 m (5' 4\")   Wt 52.2 kg (115 lb)   LMP 06/03/2025   SpO2 95%   BMI 19.74 kg/m²   Constitutional: Awake, alert in no apparent distress.  Appears uncomfortable, lying in the left lateral decubitus position for point of maximal comfort.  HENT: Normocephalic, Bilateral external ears normal. Nose normal.   Eyes: Conjunctiva normal, non-icteric, EOMI.    Thorax & Lungs: Easy unlabored respirations  Cardiovascular:    Abdomen:  No distention  Skin: Visualized skin is  Dry, No erythema, No rash.   Musculoskeletal: No " midline tenderness or step-off, the patient has the left paraspinal tenderness at the level of L5-S1.  Overlying skin normal.  Neurologic: Alert, Grossly non-focal.  5 out of 5 DF, PF on the right, 3/5 PF DF on the left, appears pain related.  SLR negative on the right, SLR positive on the left.  Sensation intact throughout the lower extremities.  3+ bilateral patella, no clonus.  Psychiatric: Affect and Mood normal        9:36 AM - Nursing notes reviewed, patient seen and examined at bedside.    Escalation of care considered, and ultimately not performed: Laboratory analysis and diagnostic imaging.    Barriers to care at this time, including but not limited to: Patient does not have established PCP.     Decision tools and prescription drugs considered including, but not limited to: Considered opioids but given the patient's prior psychiatric history would like to avoid these for now and try nonnarcotic pain management..    Decision Making:  This is a pleasant 25 y.o. year old female who presents with acute onset of low back pain while bending at the waist.  She is thin, non-smoker, does not have any risks for long-term back problems.  She is well-appearing, afebrile, no history of IVDU, nondiabetic, not concerning for infectious etiology such as epidural abscess.  Location of pain is well below the aortic bifurcation, not concerning for aortic dissection.  Location also not concerning for renal etiology.  Patient states no possibility of pregnancy.  Supportive care recommended.  Explained that we generally try to avoid bedrest but light activities is recommended.  Do not bend at the waist, no lifting over 10 pounds for at least 2 weeks, note for work given.  Will treat with Toradol and Valium here in the emergency department, prescriptions noted below.    If no improvement after 3 weeks would consider PT and MRI, though I feel that she overall has an excellent prognosis and should bounce back pretty quickly.        The patient was discharged (see d/c instructions) was told to return immediately for any signs or symptoms listed, or any worsening at all.  The patient verbally agreed to the discharge precautions and follow-up plan which is documented in EPIC.    Discharge Medications:  New Prescriptions    IBUPROFEN (MOTRIN) 600 MG TAB    Take 1 Tablet by mouth every 6 hours as needed for Moderate Pain or Inflammation.    LIDOCAINE (ASPERFLEX) 4 % PATCH    Place 1 Patch on the skin every 24 hours.    METHOCARBAMOL (ROBAXIN) 750 MG TAB    Take 2 Tablets by mouth 3 times a day for 10 days.    PREDNISONE (DELTASONE) 50 MG TAB    Take 1 Tablet by mouth every day for 5 days.       FINAL IMPRESSION  1. Strain of lumbar region, initial encounter